# Patient Record
Sex: FEMALE | Race: WHITE | NOT HISPANIC OR LATINO | Employment: OTHER | URBAN - METROPOLITAN AREA
[De-identification: names, ages, dates, MRNs, and addresses within clinical notes are randomized per-mention and may not be internally consistent; named-entity substitution may affect disease eponyms.]

---

## 2017-02-19 ENCOUNTER — PATIENT OUTREACH (OUTPATIENT)
Dept: HEALTH INFORMATION MANAGEMENT | Facility: OTHER | Age: 67
End: 2017-02-19

## 2018-06-21 ENCOUNTER — OFFICE VISIT (OUTPATIENT)
Dept: URGENT CARE | Facility: CLINIC | Age: 68
End: 2018-06-21
Payer: MEDICARE

## 2018-06-21 VITALS
HEART RATE: 84 BPM | SYSTOLIC BLOOD PRESSURE: 122 MMHG | BODY MASS INDEX: 23.05 KG/M2 | DIASTOLIC BLOOD PRESSURE: 88 MMHG | TEMPERATURE: 99 F | WEIGHT: 135 LBS | HEIGHT: 64 IN | RESPIRATION RATE: 16 BRPM | OXYGEN SATURATION: 100 %

## 2018-06-21 DIAGNOSIS — K12.2 ORAL INFECTION: ICD-10-CM

## 2018-06-21 PROCEDURE — 99214 OFFICE O/P EST MOD 30 MIN: CPT | Performed by: NURSE PRACTITIONER

## 2018-06-21 RX ORDER — AMOXICILLIN 875 MG/1
875 TABLET, COATED ORAL 2 TIMES DAILY
Qty: 14 TAB | Refills: 0 | Status: SHIPPED | OUTPATIENT
Start: 2018-06-21 | End: 2018-10-29

## 2018-06-21 ASSESSMENT — ENCOUNTER SYMPTOMS
NAUSEA: 0
SHORTNESS OF BREATH: 0
EYE DISCHARGE: 0
COUGH: 0
HEADACHES: 0
DIARRHEA: 0
WHEEZING: 0
SORE THROAT: 0
FEVER: 0
MYALGIAS: 0
ORTHOPNEA: 0
CHILLS: 0

## 2018-06-21 ASSESSMENT — PATIENT HEALTH QUESTIONNAIRE - PHQ9: CLINICAL INTERPRETATION OF PHQ2 SCORE: 0

## 2018-06-21 NOTE — PROGRESS NOTES
Subjective:      Aliza Jacobson is a 68 y.o. female who presents with Otalgia (left ear x2days, hx of ear infection)            HPI This is a new problem. 68 year old female presenting with initial complaint of left ear pain, requesting amoxicillin. States she knows this is infected. Denies fever, chills, myalgia, sore throat or congestion. She has taken ibuprofen with no improvement in symptoms.   Patient has no known allergies.  Current Outpatient Prescriptions on File Prior to Visit   Medication Sig Dispense Refill   • zolpidem (AMBIEN) 10 MG Tab Take 1 Tab by mouth at bedtime as needed for Sleep. 10 Tab 0   • sumatriptan (IMITREX) 100 MG tablet Take 1 Tab by mouth Once PRN for Migraine for up to 1 dose. 9 Tab 0     No current facility-administered medications on file prior to visit.      Social History     Social History   • Marital status: Single     Spouse name: N/A   • Number of children: N/A   • Years of education: N/A     Occupational History   • Not on file.     Social History Main Topics   • Smoking status: Former Smoker     Quit date: 7/13/1974   • Smokeless tobacco: Never Used   • Alcohol use Yes      Comment: rarely   • Drug use: No   • Sexual activity: Not on file     Other Topics Concern   • Not on file     Social History Narrative   • No narrative on file     family history includes Cancer in her father and mother; Heart Disease in her father.      Review of Systems   Constitutional: Positive for malaise/fatigue. Negative for chills and fever.   HENT: Positive for ear pain. Negative for congestion and sore throat.    Eyes: Negative for discharge.   Respiratory: Negative for cough, shortness of breath and wheezing.    Cardiovascular: Negative for chest pain and orthopnea.   Gastrointestinal: Negative for diarrhea and nausea.   Musculoskeletal: Negative for myalgias.   Neurological: Negative for headaches.   Endo/Heme/Allergies: Negative for environmental allergies.          Objective:     /88   " Pulse 84   Temp 37.2 °C (99 °F)   Resp 16   Ht 1.626 m (5' 4\")   Wt 61.2 kg (135 lb)   SpO2 100%   BMI 23.17 kg/m²      Physical Exam   Constitutional: She is oriented to person, place, and time. She appears well-developed and well-nourished. No distress.   HENT:   Head: Normocephalic and atraumatic.   Right Ear: External ear and ear canal normal. Tympanic membrane is not injected and not perforated. No middle ear effusion.   Left Ear: External ear and ear canal normal. Tympanic membrane is not injected and not perforated.  No middle ear effusion.   Nose: No mucosal edema.   Mouth/Throat: No oropharyngeal exudate or posterior oropharyngeal erythema.   Eyes: Conjunctivae are normal. Right eye exhibits no discharge. Left eye exhibits no discharge.   Neck: Normal range of motion. Neck supple.   Cardiovascular: Normal rate, regular rhythm and normal heart sounds.    No murmur heard.  Pulmonary/Chest: Effort normal and breath sounds normal. No respiratory distress.   Musculoskeletal: Normal range of motion.   Normal movement of all 4 extremities.   Lymphadenopathy:     She has no cervical adenopathy.        Right: No supraclavicular adenopathy present.        Left: No supraclavicular adenopathy present.   Neurological: She is alert and oriented to person, place, and time. Gait normal.   Skin: Skin is warm and dry.   Psychiatric: She has a normal mood and affect. Her behavior is normal. Thought content normal.   Nursing note and vitals reviewed.              Assessment/Plan:     1. Oral infection  amoxicillin (AMOXIL) 875 MG tablet     After I informed her of her healthy ears, then her complaint changed to a tooth issue. She does have some mild erythema to her area of concern in her gum. Amoxicillin x 7 days. No pain medication given. She may take ibuprofen.      "

## 2018-10-29 ENCOUNTER — HOSPITAL ENCOUNTER (OUTPATIENT)
Facility: MEDICAL CENTER | Age: 68
End: 2018-10-29
Attending: FAMILY MEDICINE
Payer: MEDICARE

## 2018-10-29 ENCOUNTER — OFFICE VISIT (OUTPATIENT)
Dept: URGENT CARE | Facility: CLINIC | Age: 68
End: 2018-10-29
Payer: MEDICARE

## 2018-10-29 VITALS
WEIGHT: 132.2 LBS | HEART RATE: 72 BPM | OXYGEN SATURATION: 98 % | RESPIRATION RATE: 18 BRPM | DIASTOLIC BLOOD PRESSURE: 90 MMHG | TEMPERATURE: 99.1 F | BODY MASS INDEX: 22.69 KG/M2 | SYSTOLIC BLOOD PRESSURE: 130 MMHG

## 2018-10-29 DIAGNOSIS — R30.0 DYSURIA: ICD-10-CM

## 2018-10-29 DIAGNOSIS — R21 RASH: ICD-10-CM

## 2018-10-29 LAB
APPEARANCE UR: CLEAR
BILIRUB UR STRIP-MCNC: NORMAL MG/DL
COLOR UR AUTO: YELLOW
GLUCOSE UR STRIP.AUTO-MCNC: NORMAL MG/DL
KETONES UR STRIP.AUTO-MCNC: NORMAL MG/DL
LEUKOCYTE ESTERASE UR QL STRIP.AUTO: NORMAL
NITRITE UR QL STRIP.AUTO: NORMAL
PH UR STRIP.AUTO: 7 [PH] (ref 5–8)
PROT UR QL STRIP: NORMAL MG/DL
RBC UR QL AUTO: NORMAL
SP GR UR STRIP.AUTO: 1.02
UROBILINOGEN UR STRIP-MCNC: 0.2 MG/DL

## 2018-10-29 PROCEDURE — 99000 SPECIMEN HANDLING OFFICE-LAB: CPT | Performed by: FAMILY MEDICINE

## 2018-10-29 PROCEDURE — 81002 URINALYSIS NONAUTO W/O SCOPE: CPT | Performed by: FAMILY MEDICINE

## 2018-10-29 PROCEDURE — 99214 OFFICE O/P EST MOD 30 MIN: CPT | Performed by: FAMILY MEDICINE

## 2018-10-29 PROCEDURE — 87086 URINE CULTURE/COLONY COUNT: CPT

## 2018-10-29 RX ORDER — NITROFURANTOIN 25; 75 MG/1; MG/1
100 CAPSULE ORAL EVERY 12 HOURS
Qty: 10 CAP | Refills: 0 | Status: SHIPPED | OUTPATIENT
Start: 2018-10-29 | End: 2018-11-03

## 2018-10-29 RX ORDER — PREDNISONE 10 MG/1
30 TABLET ORAL EVERY MORNING
Qty: 21 TAB | Refills: 0 | Status: SHIPPED | OUTPATIENT
Start: 2018-10-29 | End: 2018-11-05

## 2018-10-29 RX ORDER — TRIAMCINOLONE ACETONIDE 1 MG/G
OINTMENT TOPICAL
Qty: 45 G | Refills: 0 | Status: SHIPPED | OUTPATIENT
Start: 2018-10-29 | End: 2018-11-15

## 2018-10-29 RX ORDER — ESTRADIOL 0.05 MG/D
1 PATCH TRANSDERMAL
Refills: 0 | COMMUNITY
Start: 2018-08-15

## 2018-10-29 NOTE — PROGRESS NOTES
Subjective:      Aliza Jacobson is a 68 y.o. female who presents with Dysuria (burning sensation and just finised a coarse of antibiotics for UTI x 1 month ago ) and Rash (just moved from [place], red chest and hands )      - This is a very pleasant, well and non-toxic appearing 68 y.o. female with complaints of urinary freq/burn x 2 days. No NVFC    - itchy rash on upper chest x 2 days. Nothing new she can think of. otc benadryl helps           ALLERGIES:  Patient has no known allergies.     PMH:  Past Medical History:   Diagnosis Date   • Backpain    • Breast CA (HCC)    • Bronchitis    • Cancer (HCC)     breast ca   • Migraine    • Other specified symptom associated with female genital organs     yeast infections   • Pneumonia    • Psychiatric problem     anxiety   • Renal disorder     acute renal failure        MEDS:    Current Outpatient Prescriptions:   •  triamcinolone acetonide (KENALOG) 0.1 % Ointment, BID x 1 wk, Disp: 45 g, Rfl: 0  •  predniSONE (DELTASONE) 10 MG Tab, Take 3 Tabs by mouth every morning for 7 days., Disp: 21 Tab, Rfl: 0  •  nitrofurantoin monohydr macro (MACROBID) 100 MG Cap, Take 1 Cap by mouth every 12 hours for 5 days., Disp: 10 Cap, Rfl: 0  •  zolpidem (AMBIEN) 10 MG Tab, Take 1 Tab by mouth at bedtime as needed for Sleep., Disp: 10 Tab, Rfl: 0  •  estradiol (CLIMARA) 0.05 MG/24HR PATCH WEEKLY, 1 Patch. AS DIRECTED, Disp: , Rfl: 0  •  sumatriptan (IMITREX) 100 MG tablet, Take 1 Tab by mouth Once PRN for Migraine for up to 1 dose., Disp: 9 Tab, Rfl: 0    ** I have documented what I find to be significant in regards to past medical, social, family and surgical history  in my HPI or under PMH/PSH/FH review section, otherwise it is contributory **           HPI    Review of Systems   Genitourinary: Positive for dysuria and frequency.   Skin: Positive for itching and rash.   All other systems reviewed and are negative.         Objective:     /90   Pulse 72   Temp 37.3 °C (99.1 °F)    Resp 18   Wt 60 kg (132 lb 3.2 oz)   SpO2 98%   BMI 22.69 kg/m²      Physical Exam   Constitutional: She appears well-developed. No distress.   HENT:   Head: Normocephalic and atraumatic.   Cardiovascular: Regular rhythm.    No murmur heard.  Pulmonary/Chest: Effort normal and breath sounds normal. No respiratory distress.   Abdominal: Soft. There is no tenderness.   Neurological: She is alert. She exhibits normal muscle tone.   Skin: Skin is warm and dry.   Psychiatric: She has a normal mood and affect. Judgment normal.   Nursing note and vitals reviewed.  upper chest w/ dry erythematous patch             Assessment/Plan:         1. Rash  triamcinolone acetonide (KENALOG) 0.1 % Ointment    predniSONE (DELTASONE) 10 MG Tab    REFERRAL TO FAMILY PRACTICE    REFERRAL TO DERMATOLOGY   2. Dysuria  REFERRAL TO FAMILY PRACTICE    REFERRAL TO DERMATOLOGY    POCT Urinalysis    Urine Culture    nitrofurantoin monohydr macro (MACROBID) 100 MG Cap             Dx & d/c instructions discussed w/ patient and/or family members.     ER precautions (worsening signs symptoms and when to go to ER) discussed.    Follow up w/ PCP in 2-3 days to make sure symptoms improving and no further intervention/treatment and/or work-up needed was advised, ER if feeling worse or not improving in 2 days.    Possible side effects (i.e. Rash, GI upset/constipation, sedation, elevation of BP or sugars) of any medications given discussed.     Patient left in stable condition

## 2018-11-01 LAB
BACTERIA UR CULT: NORMAL
SIGNIFICANT IND 70042: NORMAL
SITE SITE: NORMAL
SOURCE SOURCE: NORMAL

## 2018-11-15 ENCOUNTER — OFFICE VISIT (OUTPATIENT)
Dept: URGENT CARE | Facility: CLINIC | Age: 68
End: 2018-11-15
Payer: MEDICARE

## 2018-11-15 VITALS
HEART RATE: 74 BPM | TEMPERATURE: 97.4 F | WEIGHT: 132 LBS | SYSTOLIC BLOOD PRESSURE: 116 MMHG | OXYGEN SATURATION: 100 % | HEIGHT: 64 IN | DIASTOLIC BLOOD PRESSURE: 74 MMHG | BODY MASS INDEX: 22.53 KG/M2

## 2018-11-15 DIAGNOSIS — R42 DIZZINESS: ICD-10-CM

## 2018-11-15 DIAGNOSIS — R35.0 URINARY FREQUENCY: ICD-10-CM

## 2018-11-15 LAB
APPEARANCE UR: CLEAR
BILIRUB UR STRIP-MCNC: NORMAL MG/DL
COLOR UR AUTO: YELLOW
GLUCOSE UR STRIP.AUTO-MCNC: NORMAL MG/DL
KETONES UR STRIP.AUTO-MCNC: NORMAL MG/DL
LEUKOCYTE ESTERASE UR QL STRIP.AUTO: NORMAL
NITRITE UR QL STRIP.AUTO: NORMAL
PH UR STRIP.AUTO: 6.5 [PH] (ref 5–8)
PROT UR QL STRIP: NORMAL MG/DL
RBC UR QL AUTO: NORMAL
SP GR UR STRIP.AUTO: 1.02
UROBILINOGEN UR STRIP-MCNC: 0.2 MG/DL

## 2018-11-15 PROCEDURE — 81002 URINALYSIS NONAUTO W/O SCOPE: CPT | Performed by: NURSE PRACTITIONER

## 2018-11-15 PROCEDURE — 99213 OFFICE O/P EST LOW 20 MIN: CPT | Performed by: NURSE PRACTITIONER

## 2018-11-15 ASSESSMENT — ENCOUNTER SYMPTOMS
FATIGUE: 1
DIZZINESS: 1
SENSORY CHANGE: 0
FOCAL WEAKNESS: 0
MYALGIAS: 0
CHILLS: 0
ABDOMINAL PAIN: 0
SPEECH CHANGE: 0
EYE PAIN: 0
SEIZURES: 0
NERVOUS/ANXIOUS: 1
FEVER: 0
HEADACHES: 0
CHANGE IN BOWEL HABIT: 0
VOMITING: 0
LOSS OF CONSCIOUSNESS: 0
SORE THROAT: 0
SHORTNESS OF BREATH: 0
TREMORS: 0
FLANK PAIN: 0
NAUSEA: 0
WEAKNESS: 0
TINGLING: 0

## 2018-11-15 NOTE — PROGRESS NOTES
Subjective:   Aliza Jacobson is a 68 y.o. female who presents for Urinary Frequency (Burning/Dizzy X2 weeks Took nitrofurnatol perscribed felt better for one day then came back )  Patient presents to clinic today for reevaluation of urinary frequency, burning, dizziness.  Patient was seen and evaluated 10/29 which she was placed on Macrobid for suspected UTI.  Urine culture was negative for bacterial etiology.  Patient states symptoms improved times 1 day however came back.  Patient verbalizing she feels dizzy, feels as if she is hung over.  She denies any substance, illicit drug use, alcohol use.  She has taken hydrocodone in the past for dental work in the last month.  She denies taking hydrocodone last evening.  She is eating and drinking.  She does feel increased thirstiness as she is using the restroom more frequently.  Patient unable to sleep last evening as she was up constantly going to the bathroom during the nighttime.      Urinary Frequency   This is a new problem. Episode onset: 4 days. The problem occurs constantly (night time). The problem has been unchanged. Associated symptoms include fatigue. Pertinent negatives include no abdominal pain, change in bowel habit, chest pain, chills, congestion, fever, headaches, myalgias, nausea, rash, sore throat, urinary symptoms, vomiting or weakness. Nothing aggravates the symptoms. She has tried nothing for the symptoms. The treatment provided no relief.     Review of Systems   Constitutional: Positive for fatigue. Negative for chills and fever.   HENT: Negative for congestion and sore throat.    Eyes: Negative for pain.   Respiratory: Negative for shortness of breath.    Cardiovascular: Negative for chest pain.   Gastrointestinal: Negative for abdominal pain, change in bowel habit, nausea and vomiting.   Genitourinary: Positive for dysuria and frequency. Negative for flank pain, hematuria and urgency.   Musculoskeletal: Negative for myalgias.   Skin: Negative  "for rash.   Neurological: Positive for dizziness. Negative for tingling, tremors, sensory change, speech change, focal weakness, seizures, loss of consciousness, weakness and headaches.   Psychiatric/Behavioral: The patient is nervous/anxious.      No Known Allergies   Objective:   /74   Pulse 74   Temp 36.3 °C (97.4 °F)   Ht 1.626 m (5' 4\")   Wt 59.9 kg (132 lb)   SpO2 100%   BMI 22.66 kg/m²   Physical Exam   Constitutional: She is oriented to person, place, and time. She appears well-developed and well-nourished. No distress.   HENT:   Head: Normocephalic and atraumatic.   Right Ear: Tympanic membrane normal.   Left Ear: Tympanic membrane normal.   Nose: Nose normal. Right sinus exhibits no maxillary sinus tenderness and no frontal sinus tenderness. Left sinus exhibits no maxillary sinus tenderness and no frontal sinus tenderness.   Mouth/Throat: Uvula is midline, oropharynx is clear and moist and mucous membranes are normal. No posterior oropharyngeal edema, posterior oropharyngeal erythema or tonsillar abscesses. No tonsillar exudate.   Eyes: Pupils are equal, round, and reactive to light. Conjunctivae and EOM are normal. Right eye exhibits no discharge. Left eye exhibits no discharge.   Cardiovascular: Normal rate and regular rhythm.    No murmur heard.  Pulmonary/Chest: Effort normal and breath sounds normal. No respiratory distress.   Abdominal: Soft. She exhibits no distension. There is tenderness in the suprapubic area. There is no CVA tenderness.   Musculoskeletal:        Left hip: She exhibits tenderness. She exhibits normal range of motion.        Legs:  Neurological: She is alert and oriented to person, place, and time. She has normal reflexes. She is not disoriented. No cranial nerve deficit or sensory deficit. GCS eye subscore is 4. GCS verbal subscore is 5. GCS motor subscore is 6.   Skin: Skin is warm, dry and intact.   Psychiatric: Thought content normal. Her mood appears anxious. She " is slowed.   Speech slowed         Assessment/Plan:     1. Urinary frequency     2. Dizziness       Urinalysis; negative    Previous urine culture negative for bacterial etiology do not suspect infectious etiology at this time.  We will not start patient on treatment.  Patient declining stat labs to be sent as she would like to follow-up tomorrow if symptoms not improved for lab work to be completed. Some concern if patient has taken any oral narcotics as she did stat she has been taking them for dental pain and she did appear to be slowed in her speech. Neuro exam unremarkable.     Patient given precautionary s/sx that mandate immediate follow up and evaluation in the ED. Advised of risks of not doing so.    DDX, Supportive care, and indications for immediate follow-up discussed with patient.    Instructed to return to clinic or nearest emergency department if we are not available for any change in condition, further concerns, or worsening of symptoms.    The patient demonstrated a good understanding and agreed with the treatment plan.

## 2019-02-12 ENCOUNTER — OFFICE VISIT (OUTPATIENT)
Dept: URGENT CARE | Facility: CLINIC | Age: 69
End: 2019-02-12
Payer: MEDICARE

## 2019-02-12 VITALS
RESPIRATION RATE: 16 BRPM | DIASTOLIC BLOOD PRESSURE: 82 MMHG | HEART RATE: 88 BPM | SYSTOLIC BLOOD PRESSURE: 128 MMHG | TEMPERATURE: 98.3 F | WEIGHT: 132 LBS | OXYGEN SATURATION: 98 % | HEIGHT: 64 IN | BODY MASS INDEX: 22.53 KG/M2

## 2019-02-12 DIAGNOSIS — R42 DIZZY: ICD-10-CM

## 2019-02-12 DIAGNOSIS — H93.8X2 CLOGGED EAR, LEFT: ICD-10-CM

## 2019-02-12 PROCEDURE — 99214 OFFICE O/P EST MOD 30 MIN: CPT | Performed by: FAMILY MEDICINE

## 2019-02-12 RX ORDER — FLUTICASONE PROPIONATE 50 MCG
2 SPRAY, SUSPENSION (ML) NASAL
Qty: 1 BOTTLE | Refills: 1 | Status: SHIPPED | OUTPATIENT
Start: 2019-02-12

## 2019-02-12 RX ORDER — PREDNISONE 10 MG/1
30 TABLET ORAL EVERY MORNING
Qty: 21 TAB | Refills: 0 | Status: SHIPPED | OUTPATIENT
Start: 2019-02-12 | End: 2019-02-19

## 2019-02-12 ASSESSMENT — ENCOUNTER SYMPTOMS
HEMOPTYSIS: 0
FEVER: 0
ORTHOPNEA: 0
CHILLS: 0
SHORTNESS OF BREATH: 0
DIZZINESS: 1
FOCAL WEAKNESS: 0

## 2019-02-12 NOTE — PROGRESS NOTES
Subjective:      Aliza Jacobson is a 68 y.o. female who presents with Ear Fullness (X1 week (L) ear with dizziness)      - This is a very pleasant, well and non-toxic appearing 68 y.o. female with complaints of feeling a little off balance and a little like room spins at times and foggy headed described as a feeling like she needs to get another cocaine high for past 2 wks. Lt ear has felt full/clogged w/ occasional pain x 2 wks as well. No NVFC, no CP/SOB. No focal limb weakness/numbness/heaviness or speech/vision changes.           ALLERGIES:  Patient has no known allergies.     PMH:  Past Medical History:   Diagnosis Date   • Backpain    • Breast CA (HCC)    • Bronchitis    • Cancer (HCC)     breast ca   • Migraine    • Other specified symptom associated with female genital organs     yeast infections   • Pneumonia    • Psychiatric problem     anxiety   • Renal disorder     acute renal failure        PSH:  Past Surgical History:   Procedure Laterality Date   • LUMPECTOMY  1987   • OTHER      face lift       MEDS:    Current Outpatient Prescriptions:   •  predniSONE (DELTASONE) 10 MG Tab, Take 3 Tabs by mouth every morning for 7 days., Disp: 21 Tab, Rfl: 0  •  fluticasone (FLONASE) 50 MCG/ACT nasal spray, Spray 2 Sprays in nose every bedtime., Disp: 1 Bottle, Rfl: 1  •  estradiol (CLIMARA) 0.05 MG/24HR PATCH WEEKLY, 1 Patch. AS DIRECTED, Disp: , Rfl: 0  •  zolpidem (AMBIEN) 10 MG Tab, Take 1 Tab by mouth at bedtime as needed for Sleep., Disp: 10 Tab, Rfl: 0    ** I have documented what I find to be significant in regards to past medical, social, family and surgical history  in my HPI or under PMH/PSH/FH review section, otherwise it is contributory **           HPI    Review of Systems   Constitutional: Negative for chills and fever.   HENT: Positive for ear pain.    Respiratory: Negative for hemoptysis and shortness of breath.    Cardiovascular: Negative for chest pain and orthopnea.   Neurological: Positive for  "dizziness. Negative for focal weakness.          Objective:     /82   Pulse 88   Temp 36.8 °C (98.3 °F)   Resp 16   Ht 1.626 m (5' 4\")   Wt 59.9 kg (132 lb)   SpO2 98%   BMI 22.66 kg/m²      Physical Exam   Constitutional: She appears well-developed. No distress.   HENT:   Head: Normocephalic and atraumatic.   Mouth/Throat: Oropharynx is clear and moist.   Eyes: Conjunctivae are normal.   Neck: Neck supple.   Cardiovascular: Regular rhythm.    No murmur heard.  Pulmonary/Chest: Effort normal and breath sounds normal. No respiratory distress.   Neurological: She is alert. She exhibits normal muscle tone.   Skin: Skin is warm and dry.   Psychiatric: She has a normal mood and affect. Judgment normal.   Nursing note and vitals reviewed.  Lt ear: TM normal color translucent and bulging w/ serous fluid  Rt ear: a little was build up, TM wnl.             Assessment/Plan:         1. Dizzy  REFERRAL TO FAMILY PRACTICE    REFERRAL TO ENT   2. Clogged ear, left  REFERRAL TO FAMILY PRACTICE    predniSONE (DELTASONE) 10 MG Tab    fluticasone (FLONASE) 50 MCG/ACT nasal spray    REFERRAL TO ENT             Dx & d/c instructions discussed w/ patient and/or family members.     ER precautions (worsening signs symptoms and when to go to ER) discussed.    Follow up w/ PCP in 2-3 days to make sure symptoms improving and no further intervention/treatment and/or work-up needed was advised, ER if feeling worse or not improving in 2 days.    Possible side effects (i.e. Rash, GI upset/constipation, sedation, elevation of BP or sugars) of any medications given discussed.     Patient left in stable condition            "

## 2019-02-19 ENCOUNTER — TELEPHONE (OUTPATIENT)
Dept: SCHEDULING | Facility: IMAGING CENTER | Age: 69
End: 2019-02-19

## 2019-02-20 ENCOUNTER — OFFICE VISIT (OUTPATIENT)
Dept: URGENT CARE | Facility: CLINIC | Age: 69
End: 2019-02-20
Payer: MEDICARE

## 2019-02-20 VITALS
HEIGHT: 64 IN | WEIGHT: 132 LBS | BODY MASS INDEX: 22.53 KG/M2 | RESPIRATION RATE: 16 BRPM | TEMPERATURE: 98.2 F | DIASTOLIC BLOOD PRESSURE: 88 MMHG | SYSTOLIC BLOOD PRESSURE: 134 MMHG | HEART RATE: 102 BPM | OXYGEN SATURATION: 99 %

## 2019-02-20 DIAGNOSIS — I73.00 RAYNAUD'S DISEASE WITHOUT GANGRENE: ICD-10-CM

## 2019-02-20 PROCEDURE — 99213 OFFICE O/P EST LOW 20 MIN: CPT | Performed by: FAMILY MEDICINE

## 2019-02-20 RX ORDER — SUMATRIPTAN 100 MG/1
TABLET, FILM COATED ORAL
Refills: 0 | COMMUNITY
Start: 2019-02-14 | End: 2021-05-07

## 2019-02-20 RX ORDER — DEXTROAMPHETAMINE SULFATE 10 MG/1
TABLET ORAL
Refills: 0 | COMMUNITY
Start: 2019-02-06 | End: 2021-05-07

## 2019-02-20 RX ORDER — AMITRIPTYLINE HYDROCHLORIDE 100 MG/1
100 TABLET ORAL
Refills: 0 | COMMUNITY
Start: 2019-01-18 | End: 2019-04-02

## 2019-02-22 ENCOUNTER — HOSPITAL ENCOUNTER (EMERGENCY)
Facility: MEDICAL CENTER | Age: 69
End: 2019-02-23
Attending: EMERGENCY MEDICINE
Payer: MEDICARE

## 2019-02-22 ENCOUNTER — APPOINTMENT (OUTPATIENT)
Dept: RADIOLOGY | Facility: MEDICAL CENTER | Age: 69
End: 2019-02-22
Attending: EMERGENCY MEDICINE
Payer: MEDICARE

## 2019-02-22 DIAGNOSIS — H81.392 PERIPHERAL VERTIGO INVOLVING LEFT EAR: ICD-10-CM

## 2019-02-22 PROCEDURE — 70450 CT HEAD/BRAIN W/O DYE: CPT

## 2019-02-22 PROCEDURE — 700102 HCHG RX REV CODE 250 W/ 637 OVERRIDE(OP): Performed by: EMERGENCY MEDICINE

## 2019-02-22 PROCEDURE — A9270 NON-COVERED ITEM OR SERVICE: HCPCS | Performed by: EMERGENCY MEDICINE

## 2019-02-22 PROCEDURE — 93005 ELECTROCARDIOGRAM TRACING: CPT

## 2019-02-22 PROCEDURE — 99284 EMERGENCY DEPT VISIT MOD MDM: CPT

## 2019-02-22 PROCEDURE — 93005 ELECTROCARDIOGRAM TRACING: CPT | Performed by: EMERGENCY MEDICINE

## 2019-02-22 RX ORDER — MECLIZINE HYDROCHLORIDE 25 MG/1
25 TABLET ORAL ONCE
Status: COMPLETED | OUTPATIENT
Start: 2019-02-22 | End: 2019-02-22

## 2019-02-22 RX ADMIN — MECLIZINE HYDROCHLORIDE 25 MG: 25 TABLET ORAL at 22:16

## 2019-02-23 VITALS
TEMPERATURE: 98.8 F | HEART RATE: 86 BPM | HEIGHT: 64 IN | DIASTOLIC BLOOD PRESSURE: 97 MMHG | OXYGEN SATURATION: 97 % | RESPIRATION RATE: 16 BRPM | BODY MASS INDEX: 23.15 KG/M2 | SYSTOLIC BLOOD PRESSURE: 178 MMHG | WEIGHT: 135.58 LBS

## 2019-02-23 LAB — EKG IMPRESSION: NORMAL

## 2019-02-23 RX ORDER — MECLIZINE HYDROCHLORIDE 25 MG/1
25 TABLET ORAL 3 TIMES DAILY PRN
Qty: 9 TAB | Refills: 0 | Status: SHIPPED | OUTPATIENT
Start: 2019-02-23 | End: 2019-02-26

## 2019-02-23 NOTE — ED NOTES
Pt in bed with friend at bedside, c/o increased dizziness and unbalanced feeling since awakening this morning. Pt states she has had this feeling on and of for years.

## 2019-02-23 NOTE — ED TRIAGE NOTES
Pt bib friend with c/o dizziness and lightheadedness that began earlier today. Pt denies SOB, difficulty breathing or pain anywhere. Pt states that she has a hx of dizziness but this is the worst it's ever been

## 2019-02-23 NOTE — ED NOTES
Pt in bed, friend states pt was more confused and forgetfull tonight. Pt medicated per MAR. ERP in to preform epley maneuver. Pt states she feels about the same after maneuver.

## 2019-02-23 NOTE — DISCHARGE INSTRUCTIONS
Return to the emergency department in the next 24-48 hours for any of your symptoms worsen or they are not improving you have changes in your vision speech or difficulty ambulating.

## 2019-02-23 NOTE — ED NOTES
"Pt in bed, states she feels the same, still dizzy. Pt states she does not want to know the results of CT, because she had one done a few months ago in [place], and \"was told bad news by tech, but never followed up to hear results from doctor.\" Pt afraid something will be found that is not treatable. Pt then changed her mind, states she wants the CT.  "

## 2019-02-23 NOTE — ED PROVIDER NOTES
ED Provider Note    CHIEF COMPLAINT  Chief Complaint   Patient presents with   • Near Syncopal   • Dizziness       HPI  Aliza Jacobson is a 68 y.o. female who presents to the emergency room with chief complaint of dizziness.  The patient states she has had a fullness in her left ear for several years and dizziness on and off for the same amount of time.  She states she had a workup in [place] but she has had pretty severe dizziness over the last 48 hours.  Friends states she went to dinner with her night she is just been more forgetful than normal.  The patient feels a little frazzled because she is so dizzy.  She states that the room spinning as well as a balance issue.  She denies any vision changes or speech changes any nausea or vomiting or weakness numbness or tingling in any of her extremities.  She does not endorse any ear pain or ringing at this time but states she usually feels a fullness on that side.  She does have a history of breast cancer and a former smoker but otherwise has no medical problems and takes no medications    REVIEW OF SYSTEMS  Positives as above. Pertinent negatives include weakness numbness tingling vision changes speech difficulty nausea vomiting  All other review of systems are negative    PAST MEDICAL HISTORY   has a past medical history of Backpain; Breast CA (HCC); Bronchitis; Cancer (HCC); Migraine; Other specified symptom associated with female genital organs; Pneumonia; Psychiatric problem; and Renal disorder.    SOCIAL HISTORY  Social History     Social History Main Topics   • Smoking status: Former Smoker     Quit date: 7/13/1974   • Smokeless tobacco: Never Used   • Alcohol use Yes      Comment: rarely   • Drug use: No   • Sexual activity: Not on file       SURGICAL HISTORY   has a past surgical history that includes lumpectomy (1987) and other.    CURRENT MEDICATIONS  Home Medications     Reviewed by Shade Esquivel R.N. (Registered Nurse) on 02/22/19 at 0270  Med List  "Status: Partial   Medication Last Dose Status   amitriptyline (ELAVIL) 100 MG Tab  Active   dextroamphetamine (DEXTROSTAT) 10 MG tablet  Active   estradiol (CLIMARA) 0.05 MG/24HR PATCH WEEKLY  Active   fluticasone (FLONASE) 50 MCG/ACT nasal spray  Active   sumatriptan (IMITREX) 100 MG tablet  Active   zolpidem (AMBIEN) 10 MG Tab  Active                ALLERGIES  No Known Allergies    PHYSICAL EXAM  VITAL SIGNS: /87   Pulse 94   Temp 36.9 °C (98.5 °F) (Temporal)   Resp 18   Ht 1.626 m (5' 4\")   Wt 61.5 kg (135 lb 9.3 oz)   SpO2 96%   BMI 23.27 kg/m²    Pulse ox interpretation: I interpret this pulse ox as normal.  Constitutional: Alert in no apparent distress.  HENT: Normocephalic atraumatic, MMM, bilateral tympanic membranes within normal limits  Eyes: PERRLA, L sided nystagmus, Conjunctiva normal, Non-icteric.   Neck: Normal range of motion, No tenderness, Supple, No stridor.   Cardiovascular: Regular rate and rhythm, no murmurs.   Thorax & Lungs: Normal breath sounds, No respiratory distress, No wheezing, No chest tenderness.   Abdomen: Bowel sounds normal, Soft, No tenderness, No pulsatile masses. No peritoneal signs.  Skin: Warm, Dry, No erythema, No rash.   Back: No bony tenderness, No CVA tenderness.   Extremities: Intact distal pulses, No edema, No tenderness, No cyanosis  Neurologic: Alert and oriented x3,  Symmetric smile, eyes shut tight bilaterally, forehead wrinkles bilaterally, sensation intact to light touch bilateral face, tongue midline, head turn and shoulder shrug with full strength. Hearing intact grossly bilaterally. 5/5  strength bilaterally, 5/5 tricep and bicep strength bilaterally. Sensation intact to light touch r, m, u, axillary nerves bilaterally. 5/5 strength quadricep, plantarflexion/dorsiflexion/extensor hallicus longus bilaterally. Sensation intact to light touch bilateral lower extremities in all nerve distributions. Steady gait  HINTS Exam:  1. Nystagmus: L " beating  2. Test of Skew: negative  3. Head Impulse Test: positive     DIFFERENTIAL DIAGNOSIS AND WORK UP PLAN    This is a 68 y.o. female who presents with what appears to be chronic peripheral vertigo.  She does have a left beating nystagmus with a hence exam that is positive with the head impulse test.  Will perform the Epley maneuver treat patient with some meclizine and reassess    DIAGNOSTIC STUDIES / PROCEDURES    EKG  Results for orders placed or performed during the hospital encounter of 19   EKG   Result Value Ref Range    Report       [facility] Emergency Dept.    Test Date:  2019  Pt Name:    BHAVANI JOHNSON               Department: EDS  MRN:        [id number]                      Room:  Gender:     Female                       Technician:   :        [id number]                   Requested By:ER TRIAGE PROTOCOL  Order #:    [id number]                    Reading MD: Lorena Yousif MD    Measurements  Intervals                                Axis  Rate:       82                           P:          0  TN:         130                          QRS:        61  QRSD:       90                           T:          19  QT:         380  QTc:        444    Interpretive Statements  SINUS RHYTHM no ST elevations no ST depressions no abnormal T wave  inversions, no  pathopneumonic Q waves normal intervals normal axis unchanged from prior  Compared to ECG 2012 09:55:54  No significant changes    Electronically Signed On 2019 3:52:02 PST by Lorena Yousif MD         LABS  Pertinent Lab Findings    Labs Reviewed - No data to display    RADIOLOGY  CT-HEAD W/O   Final Result         1.  No acute intracranial abnormality.   2.  Atherosclerosis.        The radiologist's interpretation of all radiological studies have been reviewed by me.      COURSE & MEDICAL DECISION MAKING  Pertinent Labs & Imaging studies reviewed. (See chart for details)    10:28 PM  Perform the Epley  "maneuver at the bedside she states she does not really feel a change in her discomfort toward the dizziness.  The friend states she is been more forgetful than normal after the last 2 days and we discussed a possible CT scan because if she did have a mild stroke over 48 hours ago potentially would be able to see it on a Noncon although her symptoms and hence exam are more indicative of a peripheral cause this will be the plan at this time    11:03 PM  Patient reassessed at the bedside after epley and antivert she states she is still feeling dizzy, pending CT    12:05 AM  Reassess patient after the CT scan again she is feeling a little bit improved states she still feeling mildly dizzy.  We discussed her normal CT scan results -patient has signs mostly consistent with peripheral vertigo she had symptoms for greater than 48 hours and on and off for the last year or 2.  She wishes to go home which I am okay with at this time we discussed the importance of following up for repeat blood pressure evaluation and return to the emergency department for any new or worsening symptoms such as dizziness associated with vision changes speech difficulty weakness numbness tingling.  She understands feels comfortable going home I do not believe this is hypertensive emergency and her current NIH score is 0    BP (!) 178/97   Pulse 86   Temp 37.1 °C (98.8 °F) (Temporal)   Resp 16   Ht 1.626 m (5' 4\")   Wt 61.5 kg (135 lb 9.3 oz)   SpO2 97%   BMI 23.27 kg/m²       The patient will return for new or worsening symptoms and is stable at the time of discharge.    The patient is referred to a primary physician for blood pressure management, diabetic screening, and for all other preventative health concerns.    DISPOSITION:  Patient will be discharged home in stable condition.    FOLLOW UP:  [facility], Emergency Dept  [address]  [place] [id number] [phone]  In 1 day  If symptoms " [address] [address]  [phone]  Schedule an appointment as soon as possible for a visit   for blood pressure recheck      OUTPATIENT MEDICATIONS:  Discharge Medication List as of 2/23/2019 12:36 AM      START taking these medications    Details   meclizine (ANTIVERT) 25 MG Tab Take 1 Tab by mouth 3 times a day as needed for up to 3 days., Disp-9 Tab, R-0, Normal                 FINAL IMPRESSION  1. Peripheral vertigo involving left ear            Electronically signed by: Lorena Yousif, 2/22/2019 9:52 PM    This dictation has been created using voice recognition software and/or scribes. The accuracy of the dictation is limited by the abilities of the software and the expertise of the scribes. I expect there may be some errors of grammar and possibly content. I made every attempt to manually correct the errors within my dictation. However, errors related to voice recognition software and/or scribes may still exist and should be interpreted within the appropriate context.

## 2019-02-27 ENCOUNTER — OFFICE VISIT (OUTPATIENT)
Dept: URGENT CARE | Facility: CLINIC | Age: 69
End: 2019-02-27
Payer: MEDICARE

## 2019-02-27 VITALS
TEMPERATURE: 98.1 F | WEIGHT: 135 LBS | SYSTOLIC BLOOD PRESSURE: 118 MMHG | HEIGHT: 64 IN | HEART RATE: 80 BPM | DIASTOLIC BLOOD PRESSURE: 80 MMHG | OXYGEN SATURATION: 97 % | BODY MASS INDEX: 23.05 KG/M2 | RESPIRATION RATE: 20 BRPM

## 2019-02-27 DIAGNOSIS — H81.399 PERIPHERAL VERTIGO, UNSPECIFIED LATERALITY: Primary | ICD-10-CM

## 2019-02-27 PROCEDURE — 99214 OFFICE O/P EST MOD 30 MIN: CPT | Performed by: NURSE PRACTITIONER

## 2019-02-27 RX ORDER — PROCHLORPERAZINE MALEATE 10 MG
10 TABLET ORAL EVERY 6 HOURS PRN
Qty: 30 TAB | Refills: 3 | Status: SHIPPED | OUTPATIENT
Start: 2019-02-27 | End: 2019-04-02

## 2019-02-27 ASSESSMENT — ENCOUNTER SYMPTOMS
VERTIGO: 1
VISUAL CHANGE: 0
CONSTITUTIONAL NEGATIVE: 1
DIZZINESS: 1
SHORTNESS OF BREATH: 0
FOCAL WEAKNESS: 0
WEAKNESS: 0
NAUSEA: 0
PSYCHIATRIC NEGATIVE: 1
EYES NEGATIVE: 1
FEVER: 0
CARDIOVASCULAR NEGATIVE: 1
COUGH: 0
FEVER: 0
TINGLING: 0
VOMITING: 0
GASTROINTESTINAL NEGATIVE: 1
SPEECH CHANGE: 0
RESPIRATORY NEGATIVE: 1
SENSORY CHANGE: 0
MUSCULOSKELETAL NEGATIVE: 1
FATIGUE: 0
NUMBNESS: 0
BLURRED VISION: 0

## 2019-02-27 NOTE — PROGRESS NOTES
"Subjective:   Aliza Jacobson is a 68 y.o. female who presents for Rash (X1 week redness on both hands/X3 days dizziness)        Rash   This is a new problem. The current episode started in the past 7 days. The problem has been waxing and waning since onset. The affected locations include the left hand and right hand. Rash characteristics: Red and blue mottling. Pertinent negatives include no congestion, cough, facial edema, fever or shortness of breath.     Review of Systems   Constitutional: Negative for fever.   HENT: Negative for congestion.    Respiratory: Negative for cough and shortness of breath.    Skin: Positive for rash.     No Known Allergies   Objective:   /88 (BP Location: Right arm, Patient Position: Sitting, BP Cuff Size: Adult)   Pulse (!) 102   Temp 36.8 °C (98.2 °F) (Temporal)   Resp 16   Ht 1.626 m (5' 4\")   Wt 59.9 kg (132 lb)   SpO2 99%   BMI 22.66 kg/m²   Physical Exam   Constitutional: She is oriented to person, place, and time. She appears well-developed and well-nourished. No distress.   Eyes: Pupils are equal, round, and reactive to light. EOM are normal.   Cardiovascular: Normal rate, regular rhythm, normal heart sounds and intact distal pulses.    Pulmonary/Chest: Effort normal and breath sounds normal. No respiratory distress.   Abdominal: Soft. Bowel sounds are normal. She exhibits no distension.   Musculoskeletal: Normal range of motion.   Neurological: She is alert and oriented to person, place, and time. She has normal reflexes.   Skin: Skin is warm and dry.   Bluish discoloration of bilateral finger and hands   Psychiatric: She has a normal mood and affect. Her behavior is normal.         Assessment/Plan:   1. Raynaud's disease without gangrene  - REFERRAL TO IMMUNOLOGY    Other orders  - amitriptyline (ELAVIL) 100 MG Tab; Take 100 mg by mouth.; Refill: 0  - dextroamphetamine (DEXTROSTAT) 10 MG tablet; TK 2 TS PO BID FOR 28 DAYS; Refill: 0  - sumatriptan (IMITREX) 100 " MG tablet; TAKE 1 TABLET BY MOUTH AT ONSET OF MIGRAINE HEADACHE MAY REPEAT IN 2 HRS IF NEEDED; Refill: 0    Differential diagnosis, natural history, supportive care, and indications for immediate follow-up discussed.

## 2019-02-27 NOTE — PATIENT INSTRUCTIONS
Vertigo  Vertigo is the feeling that you or your surroundings are moving when they are not. Vertigo can be dangerous if it occurs while you are doing something that could endanger you or others, such as driving.  What are the causes?  This condition is caused by a disturbance in the signals that are sent by your body’s sensory systems to your brain. Different causes of a disturbance can lead to vertigo, including:  · Infections, especially in the inner ear.  · A bad reaction to a drug, or misuse of alcohol and medicines.  · Withdrawal from drugs or alcohol.  · Quickly changing positions, as when lying down or rolling over in bed.  · Migraine headaches.  · Decreased blood flow to the brain.  · Decreased blood pressure.  · Increased pressure in the brain from a head or neck injury, stroke, infection, tumor, or bleeding.  · Central nervous system disorders.  What are the signs or symptoms?  Symptoms of this condition usually occur when you move your head or your eyes in different directions. Symptoms may start suddenly, and they usually last for less than a minute. Symptoms may include:  · Loss of balance and falling.  · Feeling like you are spinning or moving.  · Feeling like your surroundings are spinning or moving.  · Nausea and vomiting.  · Blurred vision or double vision.  · Difficulty hearing.  · Slurred speech.  · Dizziness.  · Involuntary eye movement (nystagmus).  Symptoms can be mild and cause only slight annoyance, or they can be severe and interfere with daily life. Episodes of vertigo may return (recur) over time, and they are often triggered by certain movements. Symptoms may improve over time.  How is this diagnosed?  This condition may be diagnosed based on medical history and the quality of your nystagmus. Your health care provider may test your eye movements by asking you to quickly change positions to trigger the nystagmus. This may be called the Nestor-Hallpike test, head thrust test, or roll test. You  may be referred to a health care provider who specializes in ear, nose, and throat (ENT) problems (otolaryngologist) or a provider who specializes in disorders of the central nervous system (neurologist).  You may have additional testing, including:  · A physical exam.  · Blood tests.  · MRI.  · A CT scan.  · An electrocardiogram (ECG). This records electrical activity in your heart.  · An electroencephalogram (EEG). This records electrical activity in your brain.  · Hearing tests.  How is this treated?  Treatment for this condition depends on the cause and the severity of the symptoms. Treatment options include:  · Medicines to treat nausea or vertigo. These are usually used for severe cases. Some medicines that are used to treat other conditions may also reduce or eliminate vertigo symptoms. These include:  ¨ Medicines that control allergies (antihistamines).  ¨ Medicines that control seizures (anticonvulsants).  ¨ Medicines that relieve depression (antidepressants).  ¨ Medicines that relieve anxiety (sedatives).  · Head movements to adjust your inner ear back to normal. If your vertigo is caused by an ear problem, your health care provider may recommend certain movements to correct the problem.  · Surgery. This is rare.  Follow these instructions at home:  Safety  · Move slowly.Avoid sudden body or head movements.  · Avoid driving.  · Avoid operating heavy machinery.  · Avoid doing any tasks that would cause danger to you or others if you would have a vertigo episode during the task.  · If you have trouble walking or keeping your balance, try using a cane for stability. If you feel dizzy or unstable, sit down right away.  · Return to your normal activities as told by your health care provider. Ask your health care provider what activities are safe for you.  General instructions  · Take over-the-counter and prescription medicines only as told by your health care provider.  · Avoid certain positions or movements as  told by your health care provider.  · Drink enough fluid to keep your urine clear or pale yellow.  · Keep all follow-up visits as told by your health care provider. This is important.  Contact a health care provider if:  · Your medicines do not relieve your vertigo or they make it worse.  · You have a fever.  · Your condition gets worse or you develop new symptoms.  · Your family or friends notice any behavioral changes.  · Your nausea or vomiting gets worse.  · You have numbness or a “pins and needles” sensation in part of your body.  Get help right away if:  · You have difficulty moving or speaking.  · You are always dizzy.  · You faint.  · You develop severe headaches.  · You have weakness in your hands, arms, or legs.  · You have changes in your hearing or vision.  · You develop a stiff neck.  · You develop sensitivity to light.  This information is not intended to replace advice given to you by your health care provider. Make sure you discuss any questions you have with your health care provider.  Document Released: 09/27/2006 Document Revised: 05/31/2017 Document Reviewed: 04/11/2016  [employer] Interactive Patient Education © 2017 [employer]

## 2019-02-27 NOTE — PROGRESS NOTES
Subjective:     Aliza Jacobson is a 68 y.o. female who presents for Vertigo (X 6 days loss of balance)       Dizziness   Associated symptoms include vertigo. Pertinent negatives include no fatigue, fever, nausea, numbness, visual change, vomiting or weakness.   Patient was seen on 2/2/2019 for further ago.  She had a full workup done including a CT of the head which was negative.  She was diagnosed with peripheral vertigo.  She was started on meclizine which the patient states she has been taking.  However, it has not been helping.  Patient states she has been dizzy for the past several years and is frustrated that she has not been able to get any relief after being worked up multiple times.  She has a primary care appointment but not until 2 more months.  She states she was referred to ENT previously but has not been contacted yet.    PMH:  has a past medical history of Backpain; Breast CA (HCC); Bronchitis; Cancer (HCC); Migraine; Other specified symptom associated with female genital organs; Pneumonia; Psychiatric problem; and Renal disorder. She also has no past medical history of Arrhythmia; Arthritis; ASTHMA; CATARACT; Congestive heart failure (HCC); Diabetes; Glaucoma; Heart murmur; Heart valve disease; Hypertension; Jaundice; Myocardial infarct (HCC); Pacemaker; Personal history of venous thrombosis and embolism; Stroke (HCC); Unspecified disorder of thyroid; or Unspecified hemorrhagic conditions.    MEDS:   Current Outpatient Prescriptions:   •  prochlorperazine (COMPAZINE) 10 MG Tab, Take 1 Tab by mouth every 6 hours as needed., Disp: 30 Tab, Rfl: 3  •  amitriptyline (ELAVIL) 100 MG Tab, Take 100 mg by mouth., Disp: , Rfl: 0  •  dextroamphetamine (DEXTROSTAT) 10 MG tablet, TK 2 TS PO BID FOR 28 DAYS, Disp: , Rfl: 0  •  sumatriptan (IMITREX) 100 MG tablet, TAKE 1 TABLET BY MOUTH AT ONSET OF MIGRAINE HEADACHE MAY REPEAT IN 2 HRS IF NEEDED, Disp: , Rfl: 0  •  fluticasone (FLONASE) 50 MCG/ACT nasal spray,  "Spray 2 Sprays in nose every bedtime., Disp: 1 Bottle, Rfl: 1  •  estradiol (CLIMARA) 0.05 MG/24HR PATCH WEEKLY, 1 Patch. AS DIRECTED, Disp: , Rfl: 0  •  zolpidem (AMBIEN) 10 MG Tab, Take 1 Tab by mouth at bedtime as needed for Sleep., Disp: 10 Tab, Rfl: 0    ALLERGIES: No Known Allergies    SURGHX:   Past Surgical History:   Procedure Laterality Date   • LUMPECTOMY  1987   • OTHER      face lift     SOCHX:  reports that she quit smoking about 44 years ago. She has never used smokeless tobacco. She reports that she drinks alcohol. She reports that she does not use drugs.     FH: Reviewed with patient, not pertinent to this visit.     Review of Systems   Constitutional: Negative.  Negative for fatigue and fever.   HENT: Negative.    Eyes: Negative.  Negative for blurred vision.   Respiratory: Negative.    Cardiovascular: Negative.    Gastrointestinal: Negative.  Negative for nausea and vomiting.   Genitourinary: Negative.    Musculoskeletal: Negative.    Skin: Negative.    Neurological: Positive for dizziness and vertigo. Negative for tingling, sensory change, speech change, focal weakness, weakness and numbness.   Psychiatric/Behavioral: Negative.    All other systems reviewed and are negative.    Objective:     /80 (BP Location: Left arm, Patient Position: Sitting, BP Cuff Size: Adult)   Pulse 80   Temp 36.7 °C (98.1 °F) (Temporal)   Resp 20   Ht 1.626 m (5' 4\")   Wt 61.2 kg (135 lb)   SpO2 97%   BMI 23.17 kg/m²     Physical Exam   Constitutional: She is oriented to person, place, and time. She appears well-developed and well-nourished. She is cooperative. No distress.   HENT:   Head: Normocephalic.   Right Ear: Tympanic membrane and external ear normal.   Left Ear: Tympanic membrane and external ear normal.   Nose: Nose normal.   Mouth/Throat: Uvula is midline, oropharynx is clear and moist and mucous membranes are normal.   Eyes: Pupils are equal, round, and reactive to light. Conjunctivae and lids " are normal. Right eye exhibits nystagmus. Left eye exhibits nystagmus.   Nestor-Hallpike test positive   Neck: Normal range of motion.   Cardiovascular: Normal rate, regular rhythm, normal heart sounds and normal pulses.    Pulmonary/Chest: Effort normal and breath sounds normal. No respiratory distress.   Abdominal: Soft. Bowel sounds are normal.   Musculoskeletal: Normal range of motion. She exhibits no deformity.   Neurological: She is alert and oriented to person, place, and time. She has normal strength. No sensory deficit. Coordination and gait normal.   Hand  strong and equal   Skin: Skin is warm, dry and intact. Capillary refill takes less than 2 seconds.   Psychiatric: She has a normal mood and affect.   Vitals reviewed.       Assessment/Plan:     1. Peripheral vertigo, unspecified laterality  - REFERRAL TO ENT  - prochlorperazine (COMPAZINE) 10 MG Tab; Take 1 Tab by mouth every 6 hours as needed.  Dispense: 30 Tab; Refill: 3    Rx sent electronically for Compazine. New referral given for ENT. Patient has primary care appointment in 2 months.    Patient advised to: Return for 1) Symptoms don't improve or worsen, or go to ER, 2) Follow up with primary care in 7-10 days.    Differential diagnosis, natural history, supportive care, and indications for immediate follow-up discussed. All questions answered. Patient agrees with the plan of care.

## 2019-04-01 ENCOUNTER — TELEPHONE (OUTPATIENT)
Dept: MEDICAL GROUP | Facility: LAB | Age: 69
End: 2019-04-01

## 2019-04-01 NOTE — TELEPHONE ENCOUNTER
NEW PATIENT VISIT PRE-VISIT PLANNING    1.  [employer] Patient is checked in Patient Demographics? YES    2.  Immunizations were updated in Epic using WebIZ?: Epic matches WebIZ       •  Web Iz Recommendations: SHINGRIX (Shingles)    3.  Is this appointment scheduled as a Hospital Follow-Up? No    4.  Patient is due for the following Health Maintenance Topics:   Health Maintenance Due   Topic Date Due   • Annual Wellness Visit  1950   • PAP SMEAR  03/31/1971   • MAMMOGRAM  03/31/1990   • COLONOSCOPY  03/31/2000   • IMM ZOSTER VACCINES (1 of 2) 03/31/2000   • BONE DENSITY  03/31/2015   • IMM PNEUMOCOCCAL 65+ (ADULT) LOW/MEDIUM RISK SERIES (1 of 2 - PCV13) 03/31/2015           5. Orders for overdue Health Maintenance topics pended in Pre-Charting? NO    6.  Reviewed/Updated the following with patient:   •   Preferred Pharmacy? NO       •   Preferred Lab? NO       •   Preferred Communication? NO       •   Allergies? NO       •   Medications? NO       •   Social History? NO       •   Family History (document living status of immediate family members and if + hx of cancer, diabetes, hypertension, hyperlipidemia, heart attack, stroke) NO    7.  Updated Care Team?       •   DME Company (gait device, O2, CPAP, etc.) NO       •   Other Specialists (eye doctor, derm, GYN, cardiology, endo, etc): NO    8.  Patient was NOT informed to arrive 15 min prior to their   scheduled appointment and bring in their medication bottles.

## 2019-04-02 ENCOUNTER — OFFICE VISIT (OUTPATIENT)
Dept: MEDICAL GROUP | Facility: LAB | Age: 69
End: 2019-04-02
Payer: MEDICARE

## 2019-04-02 VITALS
DIASTOLIC BLOOD PRESSURE: 90 MMHG | HEIGHT: 64 IN | TEMPERATURE: 97.3 F | OXYGEN SATURATION: 97 % | HEART RATE: 71 BPM | BODY MASS INDEX: 23.52 KG/M2 | WEIGHT: 137.8 LBS | SYSTOLIC BLOOD PRESSURE: 136 MMHG

## 2019-04-02 DIAGNOSIS — C50.419 MALIGNANT NEOPLASM OF UPPER-OUTER QUADRANT OF FEMALE BREAST, UNSPECIFIED ESTROGEN RECEPTOR STATUS, UNSPECIFIED LATERALITY (HCC): ICD-10-CM

## 2019-04-02 DIAGNOSIS — L65.9 HAIR LOSS: ICD-10-CM

## 2019-04-02 DIAGNOSIS — G43.809 OTHER MIGRAINE WITHOUT STATUS MIGRAINOSUS, NOT INTRACTABLE: ICD-10-CM

## 2019-04-02 DIAGNOSIS — F99 PSYCHIATRIC PROBLEM: ICD-10-CM

## 2019-04-02 DIAGNOSIS — R09.81 NASAL CONGESTION: ICD-10-CM

## 2019-04-02 DIAGNOSIS — Z00.00 PREVENTATIVE HEALTH CARE: ICD-10-CM

## 2019-04-02 PROBLEM — C50.919 BREAST CA (HCC): Status: ACTIVE | Noted: 2019-04-02

## 2019-04-02 PROCEDURE — 99214 OFFICE O/P EST MOD 30 MIN: CPT | Performed by: FAMILY MEDICINE

## 2019-04-02 RX ORDER — TAMSULOSIN HYDROCHLORIDE 0.4 MG/1
0.4 CAPSULE ORAL
COMMUNITY
End: 2021-05-07

## 2019-04-02 ASSESSMENT — PATIENT HEALTH QUESTIONNAIRE - PHQ9: CLINICAL INTERPRETATION OF PHQ2 SCORE: 0

## 2019-04-02 NOTE — PROGRESS NOTES
Subjective:     CC:  Diagnoses of Preventative health care, Malignant neoplasm of upper-outer quadrant of female breast, unspecified estrogen receptor status, unspecified laterality (HCC), Hair loss, Psychiatric problem, Other migraine without status migrainosus, not intractable, and Nasal congestion were pertinent to this visit.    HISTORY OF THE PRESENT ILLNESS: Patient is a 69 y.o. female. This pleasant patient is here today:    History of breast cancer:  This is a resolved stable issue, new to me.  She has a history of left upper quadrant breast cancer status post lumpectomy.  She has completed chemotherapy and radiation.  She does self breast exams however refuses mammograms as she states it was not caught previously on a mammogram.  She is currently on an estradiol patch for hormone replacement therapy, she is unsure about the receptor status of this.    Lightheadedness 2/2 congestion:  This is a chronic unstable issue, new to me.  Patient states that she has a lightheadedness secondary to stuffiness and nasal congestion.  She tried the Claritin-D and the Flonase and it did help her.  She discontinued use temporarily however she feels as if she needs to restart because it makes her feel spacey when she is congested.    Psychiatric problem:  This is a chronic stable issue, new to me.  Patient is not willing to divulge her diagnoses however she states she has several psychiatric issues that she is seen by Dr. Nelson in psychiatry.  Per her prescription orders she is on Dextrostat.  Adult ADD is a likely diagnosis.    Hair loss:  This is a chronic unstable issue, new to me.  Patient has a history of hair loss for the last 10 years.  She states that she has had biopsies and a full workup and is trialed steroid medications.  She would not like to repeat the workup.  She believes her diagnosis related to lupus.  She states she now wears    Migraine:  This is a chronic stable issue, new to me.  Patient has a history of  migraines however she states that she uses Imitrex only 3 times a year.  She states her typical migraine has photophobia and phonophobia.  She denies any nausea or vomiting.    Health Maintenance:     Allergies: Patient has no known allergies.    Current Outpatient Prescriptions Ordered in [facility]   Medication Sig Dispense Refill   • Loratadine-Pseudoephedrine (CLARITIN-D 24 HOUR PO) Take  by mouth.     • tamsulosin (FLOMAX) 0.4 MG capsule Take 0.4 mg by mouth ONE-HALF HOUR AFTER BREAKFAST.     • zolpidem (AMBIEN) 10 MG Tab Take 1 Tab by mouth at bedtime as needed for Sleep. 10 Tab 0   • dextroamphetamine (DEXTROSTAT) 10 MG tablet TK 2 TS PO BID FOR 28 DAYS  0   • sumatriptan (IMITREX) 100 MG tablet TAKE 1 TABLET BY MOUTH AT ONSET OF MIGRAINE HEADACHE MAY REPEAT IN 2 HRS IF NEEDED  0   • fluticasone (FLONASE) 50 MCG/ACT nasal spray Spray 2 Sprays in nose every bedtime. (Patient not taking: Reported on 4/2/2019) 1 Bottle 1   • estradiol (CLIMARA) 0.05 MG/24HR PATCH WEEKLY 1 Patch. AS DIRECTED  0     No current [facility]-ordered facility-administered medications on file.        Past Medical History:   Diagnosis Date   • Backpain    • Breast CA (HCC)    • Bronchitis    • Cancer (HCC)     breast ca   • Migraine    • Other specified symptom associated with female genital organs     yeast infections   • Pneumonia    • Psychiatric problem     anxiety   • Renal disorder     acute renal failure       Past Surgical History:   Procedure Laterality Date   • LUMPECTOMY  1987   • OTHER      face lift       Social History   Substance Use Topics   • Smoking status: Former Smoker     Packs/day: 1.00     Years: 1.00     Quit date: 7/13/1974   • Smokeless tobacco: Never Used   • Alcohol use Yes      Comment: rarely       Social History     Social History Narrative   • No narrative on file       Family History   Problem Relation Age of Onset   • Cancer Mother    • Cancer Father    • Heart Disease Father        ROS:   Gen: no fevers/chills, no  "changes in weight  Eyes: no changes in vision  ENT: no sore throat, no hearing loss, no bloody nose  Pulm: no sob, no cough  CV: no chest pain, no palpitations  GI: no nausea/vomiting, no diarrhea  : no dysuria  MSk: no myalgias  Skin: no rash  Neuro: no headaches, no numbness/tingling  Heme/Lymph: no easy bruising      Objective:     Exam: Blood pressure 136/90, pulse 71, temperature 36.3 °C (97.3 °F), temperature source Temporal, height 1.626 m (5' 4\"), weight 62.5 kg (137 lb 12.8 oz), SpO2 97 %. Body mass index is 23.65 kg/m².    General: Normal appearing. No distress.  HEENT: Normocephalic. Eyes conjunctiva clear lids without ptosis, pupils equal  and reactive to light accommodation, ears normal shape and contour, canals are clear bilaterally, tympanic membranes are benign, nasal mucosa benign, oropharynx is without erythema, edema or exudates.   Neck: Supple without JVD or bruit. Thyroid is not enlarged.  Pulmonary: Clear to ausculation.  Normal effort. No rales, ronchi, or wheezing.  Cardiovascular: Regular rate and rhythm without murmur. Carotid and radial pulses are intact and equal bilaterally.  Abdomen: Soft, nontender, nondistended. Normal bowel sounds. Liver and spleen are not palpable  Neurologic: Grossly nonfocal  Lymph: No cervical or supraclavicular lymph nodes are palpable  Skin: Warm and dry.  No obvious lesions.  Musculoskeletal: Normal gait. No extremity cyanosis, clubbing, or edema.  Psych: Normal mood and affect. Alert and oriented x3. Judgment and insight is normal.    Assessment & Plan:   69 y.o. female with the following -    1. Preventative health care  She would not like to do any preventative care with the exception of labs.  - HEMOGLOBIN A1C; Future  - CBC WITH DIFFERENTIAL; Future  - Comp Metabolic Panel; Future  - Lipid Profile; Future  - TSH WITH REFLEX TO FT4; Future    2. Malignant neoplasm of upper-outer quadrant of female breast, unspecified estrogen receptor status, unspecified " laterality (HCC)  Patient declined any sort of surveillance.  I did discuss the estrogen patch she is on for hormone replacement therapy as I do not know her receptor positive status.  Patient understands the risks.  She would like to continue this.    3. Hair loss  Patient states she has completed workup and several treatments.  She would not like to further work this up.    4. Psychiatric problem  Unable to acquire detail of diagnoses.  Continue surveillance with psychiatry.    5. Other migraine without status migrainosus, not intractable  Resolved.  Patient can continue sumatriptan as needed.    6. Nasal congestion  Patient has had plastic surgery including a rhinoplasty.  She will continue surveillance with ENT.          Please note that this dictation was created using voice recognition software. I have made every reasonable attempt to correct obvious errors, but I expect that there are errors of grammar and possibly content that I did not discover before finalizing the note.

## 2019-04-02 NOTE — LETTER
[facility]  More Valero M.D.  [address]  [place] [address]  Fax: [phone]   Authorization for Release/Disclosure of   Protected Health Information   Name: BHAVANI JOHNSON : 1950 SSN: [id number]   Address:  [address]  [place] [address] Phone:    [phone] (home)    I authorize the entity listed below to release/disclose the PHI below to:   [facility]/More Valero M.D. and More Valero M.D.   Provider or Entity Name:     Address   City, State, Zip   Phone:      Fax:     Reason for request: continuity of care   Information to be released:    [  ] LAST COLONOSCOPY,  including any PATH REPORT and follow-up  [  ] LAST FIT/COLOGUARD RESULT [  ] LAST DEXA  [  ] LAST MAMMOGRAM  [  ] LAST PAP  [  ] LAST LABS [  ] RETINA EXAM REPORT  [  ] IMMUNIZATION RECORDS  [  ] Release all info      [  ] Check here and initial the line next to each item to release ALL health information INCLUDING  _____ Care and treatment for drug and / or alcohol abuse  _____ HIV testing, infection status, or AIDS  _____ Genetic Testing    DATES OF SERVICE OR TIME PERIOD TO BE DISCLOSED: _____________  I understand and acknowledge that:  * This Authorization may be revoked at any time by you in writing, except if your health information has already been used or disclosed.  * Your health information that will be used or disclosed as a result of you signing this authorization could be re-disclosed by the recipient. If this occurs, your re-disclosed health information may no longer be protected by State or Federal laws.  * You may refuse to sign this Authorization. Your refusal will not affect your ability to obtain treatment.  * This Authorization becomes effective upon signing and will  on (date) __________.      If no date is indicated, this Authorization will  one (1) year from the signature date.    Name: Bhavani Johnson    Signature:   Date:     2019       PLEASE FAX REQUESTED RECORDS BACK  TO: [phone]

## 2019-06-03 ENCOUNTER — OFFICE VISIT (OUTPATIENT)
Dept: MEDICAL GROUP | Facility: LAB | Age: 69
End: 2019-06-03
Payer: MEDICARE

## 2019-06-03 VITALS
OXYGEN SATURATION: 97 % | DIASTOLIC BLOOD PRESSURE: 90 MMHG | WEIGHT: 144.6 LBS | HEART RATE: 88 BPM | BODY MASS INDEX: 24.69 KG/M2 | HEIGHT: 64 IN | TEMPERATURE: 98 F | SYSTOLIC BLOOD PRESSURE: 138 MMHG

## 2019-06-03 DIAGNOSIS — R29.818 OTHER SYMPTOMS AND SIGNS INVOLVING THE NERVOUS SYSTEM: ICD-10-CM

## 2019-06-03 DIAGNOSIS — Z11.3 SCREENING FOR STD (SEXUALLY TRANSMITTED DISEASE): ICD-10-CM

## 2019-06-03 PROCEDURE — 99214 OFFICE O/P EST MOD 30 MIN: CPT | Performed by: FAMILY MEDICINE

## 2019-06-03 NOTE — PROGRESS NOTES
Subjective:     CC: Dizziness    HPI:   Aliza presents today with     Dizziness:  This is a chronic issue, new to me.  Patient unable to verbalize her current issue and is having trouble with word finding.  She does not have any weakness or facial droop.  She is tearful and unable to qualify and quantify exactly what is going on with her.  Per chart review she has had a history of dizziness that she states has been worked up multiple times and has had no relief.  Her last CT was without abnormality in her MRI in 2012 was without abnormality.  She was referred to an ENT and given meclizine and Compazine and has not worked for her.  She had positive nystagmus on her last urgent care visit.  All she is able to states that she is dizzy and her head is spinning.  Of significance she has a psychiatric history which she does not share and she states that she was on medications that she is not on as they do not make her feel good.  Her psychiatrist is Dr. Nelson.  She believes it is all related to her allergies but is unable to give a further history.  She just states that it goes and comes and then becomes tearful.    Past Medical History:   Diagnosis Date   • Backpain    • Breast CA (HCC)    • Bronchitis    • Cancer (HCC)     breast ca   • Migraine    • Other specified symptom associated with female genital organs     yeast infections   • Pneumonia    • Psychiatric problem     anxiety   • Renal disorder     acute renal failure       Social History   Substance Use Topics   • Smoking status: Former Smoker     Packs/day: 1.00     Years: 1.00     Quit date: 7/13/1974   • Smokeless tobacco: Never Used   • Alcohol use Yes      Comment: rarely       Current Outpatient Prescriptions Ordered in [facility]   Medication Sig Dispense Refill   • Loratadine-Pseudoephedrine (CLARITIN-D 24 HOUR PO) Take  by mouth.     • tamsulosin (FLOMAX) 0.4 MG capsule Take 0.4 mg by mouth ONE-HALF HOUR AFTER BREAKFAST.     • dextroamphetamine (DEXTROSTAT) 10  "MG tablet TK 2 TS PO BID FOR 28 DAYS  0   • sumatriptan (IMITREX) 100 MG tablet TAKE 1 TABLET BY MOUTH AT ONSET OF MIGRAINE HEADACHE MAY REPEAT IN 2 HRS IF NEEDED  0   • fluticasone (FLONASE) 50 MCG/ACT nasal spray Spray 2 Sprays in nose every bedtime. (Patient not taking: Reported on 4/2/2019) 1 Bottle 1   • estradiol (CLIMARA) 0.05 MG/24HR PATCH WEEKLY 1 Patch. AS DIRECTED  0   • zolpidem (AMBIEN) 10 MG Tab Take 1 Tab by mouth at bedtime as needed for Sleep. 10 Tab 0     No current Epic-ordered facility-administered medications on file.        Allergies:  Patient has no known allergies.    Health Maintenance: Completed    ROS:  Gen: no fevers/chill, no changes in weight  Eyes: no changes in vision  Neuro: \"Dizzy \"        Objective:       Exam:  /90 (BP Location: Left arm, Patient Position: Sitting)   Pulse 88   Temp 36.7 °C (98 °F) (Temporal)   Ht 1.626 m (5' 4\")   Wt 65.6 kg (144 lb 9.6 oz)   SpO2 97%   BMI 24.82 kg/m²  Body mass index is 24.82 kg/m².    Gen: Alert and oriented, No apparent distress.  Neck: Neck is supple without lymphadenopathy.  Lungs: Normal effort, CTA bilaterally, no wheezes, rhonchi, or rales  CV: Regular rate and rhythm. No murmurs, rubs, or gallops.               Ext: No clubbing, cyanosis, edema.  Neuro: Unable to participate in a neuro exam.  Tardive dyskinesia on exam    Assessment & Plan:     69 y.o. female with the following -     1.  Dizziness  My recommendation is to go to the ER for possible stroke work-up given her issues with word finding.  She is refusing and is refusing to sign an against medical advice paper.  She is refusing ambulance.  She is unable to give me a history of her dizziness and is very tearful.  I am unable to examine her.  She did agree to doing an MRI as well as her pending labs.  Strict ER precautions discussed.  She does have tardive dyskinesia that is apparent and part of her dizziness could be attributing from her history of vertigo or her " psychiatric issues.  Patient did not make follow-up appointment.  - MR-BRAIN-WITH & W/O; Future  - US-CAROTID DOPPLER BILAT; Future    2. Screening for STD (sexually transmitted disease)  Ordered today  - T.PALLIDUM AB EIA; Future  - HIV AG/AB COMBO ASSAY SCREENING; Future  - Chlamydia/GC PCR Urine Or Swab; Future        Please note that this dictation was created using voice recognition software. I have made every reasonable attempt to correct obvious errors, but I expect that there are errors of grammar and possibly content that I did not discover before finalizing the note.

## 2019-06-20 ENCOUNTER — HOSPITAL ENCOUNTER (OUTPATIENT)
Dept: RADIOLOGY | Facility: MEDICAL CENTER | Age: 69
End: 2019-06-20
Attending: FAMILY MEDICINE

## 2019-07-03 ENCOUNTER — OFFICE VISIT (OUTPATIENT)
Dept: URGENT CARE | Facility: CLINIC | Age: 69
End: 2019-07-03
Payer: MEDICARE

## 2019-07-03 VITALS
TEMPERATURE: 98.4 F | HEIGHT: 64 IN | SYSTOLIC BLOOD PRESSURE: 138 MMHG | WEIGHT: 145.2 LBS | DIASTOLIC BLOOD PRESSURE: 88 MMHG | HEART RATE: 92 BPM | BODY MASS INDEX: 24.79 KG/M2 | RESPIRATION RATE: 20 BRPM | OXYGEN SATURATION: 96 %

## 2019-07-03 DIAGNOSIS — G43.009 MIGRAINE WITHOUT AURA AND WITHOUT STATUS MIGRAINOSUS, NOT INTRACTABLE: ICD-10-CM

## 2019-07-03 PROCEDURE — 99214 OFFICE O/P EST MOD 30 MIN: CPT | Performed by: NURSE PRACTITIONER

## 2019-07-03 RX ORDER — SUMATRIPTAN 100 MG/1
100 TABLET, FILM COATED ORAL
Qty: 10 TAB | Refills: 3 | Status: SHIPPED | OUTPATIENT
Start: 2019-07-03

## 2019-07-03 ASSESSMENT — ENCOUNTER SYMPTOMS
PHOTOPHOBIA: 0
FEVER: 0
SENSORY CHANGE: 0
DOUBLE VISION: 0
TINGLING: 0
NAUSEA: 1
FOCAL WEAKNESS: 0
ABDOMINAL PAIN: 0
VOMITING: 0
WEAKNESS: 0
BLURRED VISION: 0
HEADACHES: 1
MIGRAINE HEADACHES: 1
VISUAL CHANGE: 0
LOSS OF BALANCE: 0
NUMBNESS: 0
SPEECH CHANGE: 0
DIZZINESS: 0

## 2019-07-04 NOTE — PROGRESS NOTES
Subjective:     Aliza Jacobson is a 69 y.o. female who presents for Migraine (patient would like imitrex refill for her migraines, on and off x20 years)      Headaches and using imitrex (pill form) since the . Refill . Similar to all headaches. Last migraine was over 4 months ago. No accessible PCP. Needs to establish a new one.      Headache    This is a recurrent problem. The current episode started yesterday. The pain is located in the bilateral region. The pain does not radiate. The pain quality is similar to prior headaches. The quality of the pain is described as squeezing. The pain is at a severity of 8/10. Associated symptoms include nausea. Pertinent negatives include no abdominal pain, blurred vision, dizziness, ear pain, fever, loss of balance, muscle aches, numbness, photophobia, tingling, visual change, vomiting or weakness. She has tried NSAIDs for the symptoms. The treatment provided moderate relief. Her past medical history is significant for migraine headaches. There is no history of hypertension or recent head traumas.       Past Medical History:   Diagnosis Date   • Backpain    • Breast CA (HCC)    • Bronchitis    • Cancer (HCC)     breast ca   • Migraine    • Other specified symptom associated with female genital organs     yeast infections   • Pneumonia    • Psychiatric problem     anxiety   • Renal disorder     acute renal failure       Past Surgical History:   Procedure Laterality Date   • LUMPECTOMY     • OTHER      face lift       Social History     Social History   • Marital status: Single     Spouse name: N/A   • Number of children: N/A   • Years of education: N/A     Occupational History   • Not on file.     Social History Main Topics   • Smoking status: Former Smoker     Packs/day: 1.00     Years: 1.00     Quit date: 1974   • Smokeless tobacco: Never Used   • Alcohol use Yes      Comment: rarely   • Drug use: No   • Sexual activity: No     Other Topics Concern   •  "Not on file     Social History Narrative   • No narrative on file        Family History   Problem Relation Age of Onset   • Cancer Mother    • Cancer Father    • Heart Disease Father         No Known Allergies    Review of Systems   Constitutional: Negative for fever.   HENT: Negative for congestion and ear pain.    Eyes: Negative for blurred vision, double vision and photophobia.   Gastrointestinal: Positive for nausea. Negative for abdominal pain and vomiting.   Neurological: Positive for headaches. Negative for dizziness, tingling, sensory change, speech change, focal weakness, weakness, numbness and loss of balance.   All other systems reviewed and are negative.       Objective:   /88 (BP Location: Left arm, Patient Position: Sitting, BP Cuff Size: Adult)   Pulse 92   Temp 36.9 °C (98.4 °F) (Temporal)   Resp 20   Ht 1.626 m (5' 4\")   Wt 65.9 kg (145 lb 3.2 oz)   SpO2 96%   BMI 24.92 kg/m²     Physical Exam   Constitutional: She is oriented to person, place, and time. She appears well-developed and well-nourished. No distress.   HENT:   Head: Normocephalic.   Right Ear: Tympanic membrane, external ear and ear canal normal.   Left Ear: Tympanic membrane, external ear and ear canal normal.   Nose: Nose normal.   Mouth/Throat: Uvula is midline, oropharynx is clear and moist and mucous membranes are normal.   Eyes: Pupils are equal, round, and reactive to light. Conjunctivae and EOM are normal.   Neck: Normal range of motion.   Cardiovascular: Normal rate.    Pulmonary/Chest: Effort normal and breath sounds normal.   Musculoskeletal: Normal range of motion.   Lymphadenopathy:     She has no cervical adenopathy.   Neurological: She is alert and oriented to person, place, and time. She has normal strength. No sensory deficit. GCS eye subscore is 4. GCS verbal subscore is 5. GCS motor subscore is 6.   Skin: Skin is warm and dry.   Psychiatric: She has a normal mood and affect. Her speech is normal and " behavior is normal. Judgment and thought content normal.       Assessment/Plan:   1. Migraine without aura and without status migrainosus, not intractable  - REFERRAL TO FOLLOW-UP WITH PRIMARY CARE  - sumatriptan (IMITREX) 100 MG tablet; Take 1 Tab by mouth Once PRN for Migraine for up to 1 dose.  Dispense: 10 Tab; Refill: 3    Follow up for changes in quality of headache, weakness, changes in speech, dizziness, or any other concerns.     Differential diagnosis, natural history, supportive care, and indications for immediate follow-up discussed.

## 2019-10-21 ENCOUNTER — HOSPITAL ENCOUNTER (OUTPATIENT)
Facility: MEDICAL CENTER | Age: 69
End: 2019-10-21
Attending: FAMILY MEDICINE
Payer: MEDICARE

## 2019-10-21 ENCOUNTER — OFFICE VISIT (OUTPATIENT)
Dept: URGENT CARE | Facility: CLINIC | Age: 69
End: 2019-10-21
Payer: MEDICARE

## 2019-10-21 VITALS
SYSTOLIC BLOOD PRESSURE: 112 MMHG | WEIGHT: 149 LBS | HEART RATE: 71 BPM | DIASTOLIC BLOOD PRESSURE: 74 MMHG | RESPIRATION RATE: 16 BRPM | BODY MASS INDEX: 25.58 KG/M2 | TEMPERATURE: 97 F | OXYGEN SATURATION: 100 %

## 2019-10-21 DIAGNOSIS — R30.0 DYSURIA: ICD-10-CM

## 2019-10-21 LAB
APPEARANCE UR: CLEAR
BILIRUB UR STRIP-MCNC: NORMAL MG/DL
COLOR UR AUTO: YELLOW
GLUCOSE UR STRIP.AUTO-MCNC: NORMAL MG/DL
KETONES UR STRIP.AUTO-MCNC: NORMAL MG/DL
LEUKOCYTE ESTERASE UR QL STRIP.AUTO: NORMAL
NITRITE UR QL STRIP.AUTO: NORMAL
PH UR STRIP.AUTO: 6.5 [PH] (ref 5–8)
PROT UR QL STRIP: NORMAL MG/DL
RBC UR QL AUTO: NORMAL
SP GR UR STRIP.AUTO: 1.01
UROBILINOGEN UR STRIP-MCNC: 0.2 MG/DL

## 2019-10-21 PROCEDURE — 87086 URINE CULTURE/COLONY COUNT: CPT

## 2019-10-21 PROCEDURE — 81002 URINALYSIS NONAUTO W/O SCOPE: CPT | Performed by: FAMILY MEDICINE

## 2019-10-21 PROCEDURE — 99214 OFFICE O/P EST MOD 30 MIN: CPT | Performed by: FAMILY MEDICINE

## 2019-10-21 RX ORDER — CEFDINIR 300 MG/1
300 CAPSULE ORAL EVERY 12 HOURS
Qty: 10 CAP | Refills: 0 | Status: SHIPPED | OUTPATIENT
Start: 2019-10-21 | End: 2019-10-26

## 2019-10-22 DIAGNOSIS — R30.0 DYSURIA: ICD-10-CM

## 2019-10-22 NOTE — PROGRESS NOTES
Subjective:      Aliza Jacobson is a 69 y.o. female who presents with Urinary Frequency (bloating and burning x2 or 3 days )      - This is a pleasant and non toxic appearing 69 y.o. female with c/o urinary freq and burning x 2-3 days, some suprapubic bloating. No NVFC             ALLERGIES:  Patient has no known allergies.     PMH:  Past Medical History:   Diagnosis Date   • Backpain    • Breast CA (HCC)    • Bronchitis    • Cancer (HCC)     breast ca   • Migraine    • Other specified symptom associated with female genital organs     yeast infections   • Pneumonia    • Psychiatric problem     anxiety   • Renal disorder     acute renal failure        PSH:  Past Surgical History:   Procedure Laterality Date   • LUMPECTOMY  1987   • OTHER      face lift       MEDS:    Current Outpatient Medications:   •  cefdinir (OMNICEF) 300 MG Cap, Take 1 Cap by mouth every 12 hours for 5 days., Disp: 10 Cap, Rfl: 0  •  sumatriptan (IMITREX) 100 MG tablet, Take 1 Tab by mouth Once PRN for Migraine for up to 1 dose., Disp: 10 Tab, Rfl: 3  •  Loratadine-Pseudoephedrine (CLARITIN-D 24 HOUR PO), Take  by mouth., Disp: , Rfl:   •  tamsulosin (FLOMAX) 0.4 MG capsule, Take 0.4 mg by mouth ONE-HALF HOUR AFTER BREAKFAST., Disp: , Rfl:   •  dextroamphetamine (DEXTROSTAT) 10 MG tablet, TK 2 TS PO BID FOR 28 DAYS, Disp: , Rfl: 0  •  sumatriptan (IMITREX) 100 MG tablet, TAKE 1 TABLET BY MOUTH AT ONSET OF MIGRAINE HEADACHE MAY REPEAT IN 2 HRS IF NEEDED, Disp: , Rfl: 0  •  fluticasone (FLONASE) 50 MCG/ACT nasal spray, Spray 2 Sprays in nose every bedtime. (Patient not taking: Reported on 4/2/2019), Disp: 1 Bottle, Rfl: 1  •  estradiol (CLIMARA) 0.05 MG/24HR PATCH WEEKLY, 1 Patch. AS DIRECTED, Disp: , Rfl: 0    ** I have documented what I find to be significant in regards to past medical, social, family and surgical history  in my HPI or under PMH/PSH/FH review section, otherwise it is contributory **           HPI    Review of Systems    Genitourinary: Positive for dysuria and frequency.   All other systems reviewed and are negative.         Objective:     /74   Pulse 71   Temp 36.1 °C (97 °F) (Temporal)   Resp 16   Wt 67.6 kg (149 lb)   SpO2 100%   BMI 25.58 kg/m²      Physical Exam   Constitutional: She appears well-developed and well-nourished. No distress.   HENT:   Head: Normocephalic and atraumatic.   Eyes: Conjunctivae are normal.   Cardiovascular: Normal heart sounds.   No murmur heard.  Pulmonary/Chest: Effort normal and breath sounds normal. No respiratory distress.   Neurological: She is alert. She exhibits normal muscle tone.   Skin: Skin is warm and dry. She is not diaphoretic.   Psychiatric: She has a normal mood and affect. Judgment normal.   Nursing note and vitals reviewed.              Assessment/Plan:         1. Dysuria  REFERRAL TO FAMILY PRACTICE    POCT Urinalysis    Urine Culture    cefdinir (OMNICEF) 300 MG Cap       - rest/hydrate       Dx & d/c instructions discussed w/ patient and/or family members.     Follow up with PCP (or here if PCP unavailable) in 2-3 days if symptoms not improving, ER if feeling/getting worse.    Any realistic and/or common medication side effects that may have been given today(i.e. Rash, GI upset/constipation, sedation, elevation of BP or blood sugars) reviewed.     Patient left in stable condition

## 2019-10-24 LAB
BACTERIA UR CULT: ABNORMAL
BACTERIA UR CULT: ABNORMAL
SIGNIFICANT IND 70042: ABNORMAL
SITE SITE: ABNORMAL
SOURCE SOURCE: ABNORMAL

## 2020-04-01 PROBLEM — G43.909 MIGRAINE: Status: ACTIVE | Noted: 2019-04-02

## 2021-01-15 DIAGNOSIS — Z23 NEED FOR VACCINATION: ICD-10-CM

## 2021-05-07 ENCOUNTER — APPOINTMENT (OUTPATIENT)
Dept: RADIOLOGY | Facility: MEDICAL CENTER | Age: 71
End: 2021-05-07
Attending: HOSPITALIST
Payer: MEDICARE

## 2021-05-07 ENCOUNTER — TELEPHONE (OUTPATIENT)
Dept: URGENT CARE | Facility: CLINIC | Age: 71
End: 2021-05-07

## 2021-05-07 ENCOUNTER — HOSPITAL ENCOUNTER (OUTPATIENT)
Facility: MEDICAL CENTER | Age: 71
End: 2021-05-08
Attending: EMERGENCY MEDICINE | Admitting: HOSPITALIST
Payer: MEDICARE

## 2021-05-07 ENCOUNTER — APPOINTMENT (OUTPATIENT)
Dept: RADIOLOGY | Facility: MEDICAL CENTER | Age: 71
End: 2021-05-07
Attending: EMERGENCY MEDICINE
Payer: MEDICARE

## 2021-05-07 DIAGNOSIS — G45.9 TIA (TRANSIENT ISCHEMIC ATTACK): ICD-10-CM

## 2021-05-07 DIAGNOSIS — R42 VERTIGO: ICD-10-CM

## 2021-05-07 DIAGNOSIS — R47.1 DYSARTHRIA: ICD-10-CM

## 2021-05-07 PROBLEM — I15.9 SECONDARY HYPERTENSION: Status: ACTIVE | Noted: 2021-05-07

## 2021-05-07 PROBLEM — Z66 DNR (DO NOT RESUSCITATE): Status: ACTIVE | Noted: 2021-05-07

## 2021-05-07 LAB
ANION GAP SERPL CALC-SCNC: 8 MMOL/L (ref 7–16)
APTT PPP: 34.3 SEC (ref 24.7–36)
BASOPHILS # BLD AUTO: 0.3 % (ref 0–1.8)
BASOPHILS # BLD: 0.02 K/UL (ref 0–0.12)
BUN SERPL-MCNC: 19 MG/DL (ref 8–22)
CALCIUM SERPL-MCNC: 9.2 MG/DL (ref 8.4–10.2)
CHLORIDE SERPL-SCNC: 107 MMOL/L (ref 96–112)
CO2 SERPL-SCNC: 25 MMOL/L (ref 20–33)
CREAT SERPL-MCNC: 1.18 MG/DL (ref 0.5–1.4)
EKG IMPRESSION: NORMAL
EOSINOPHIL # BLD AUTO: 0.1 K/UL (ref 0–0.51)
EOSINOPHIL NFR BLD: 1.7 % (ref 0–6.9)
ERYTHROCYTE [DISTWIDTH] IN BLOOD BY AUTOMATED COUNT: 46.3 FL (ref 35.9–50)
GLUCOSE SERPL-MCNC: 93 MG/DL (ref 65–99)
HCT VFR BLD AUTO: 40.7 % (ref 37–47)
HGB BLD-MCNC: 12.9 G/DL (ref 12–16)
IMM GRANULOCYTES # BLD AUTO: 0.02 K/UL (ref 0–0.11)
IMM GRANULOCYTES NFR BLD AUTO: 0.3 % (ref 0–0.9)
INR PPP: 0.97 (ref 0.87–1.13)
LYMPHOCYTES # BLD AUTO: 1.09 K/UL (ref 1–4.8)
LYMPHOCYTES NFR BLD: 18.7 % (ref 22–41)
MAGNESIUM SERPL-MCNC: 2.1 MG/DL (ref 1.5–2.5)
MCH RBC QN AUTO: 28.7 PG (ref 27–33)
MCHC RBC AUTO-ENTMCNC: 31.7 G/DL (ref 33.6–35)
MCV RBC AUTO: 90.4 FL (ref 81.4–97.8)
MONOCYTES # BLD AUTO: 0.51 K/UL (ref 0–0.85)
MONOCYTES NFR BLD AUTO: 8.8 % (ref 0–13.4)
NEUTROPHILS # BLD AUTO: 4.08 K/UL (ref 2–7.15)
NEUTROPHILS NFR BLD: 70.2 % (ref 44–72)
NRBC # BLD AUTO: 0 K/UL
NRBC BLD-RTO: 0 /100 WBC
PLATELET # BLD AUTO: 218 K/UL (ref 164–446)
PMV BLD AUTO: 11.8 FL (ref 9–12.9)
POTASSIUM SERPL-SCNC: 4.9 MMOL/L (ref 3.6–5.5)
PROTHROMBIN TIME: 12.6 SEC (ref 12–14.6)
RBC # BLD AUTO: 4.5 M/UL (ref 4.2–5.4)
SODIUM SERPL-SCNC: 140 MMOL/L (ref 135–145)
TROPONIN T SERPL-MCNC: 13 NG/L (ref 6–19)
WBC # BLD AUTO: 5.8 K/UL (ref 4.8–10.8)

## 2021-05-07 PROCEDURE — 700102 HCHG RX REV CODE 250 W/ 637 OVERRIDE(OP): Performed by: HOSPITALIST

## 2021-05-07 PROCEDURE — 85610 PROTHROMBIN TIME: CPT

## 2021-05-07 PROCEDURE — 93005 ELECTROCARDIOGRAM TRACING: CPT

## 2021-05-07 PROCEDURE — 99220 PR INITIAL OBSERVATION CARE,LEVL III: CPT | Performed by: HOSPITALIST

## 2021-05-07 PROCEDURE — 99285 EMERGENCY DEPT VISIT HI MDM: CPT

## 2021-05-07 PROCEDURE — 700105 HCHG RX REV CODE 258: Performed by: HOSPITALIST

## 2021-05-07 PROCEDURE — G0378 HOSPITAL OBSERVATION PER HR: HCPCS

## 2021-05-07 PROCEDURE — 83036 HEMOGLOBIN GLYCOSYLATED A1C: CPT

## 2021-05-07 PROCEDURE — 85730 THROMBOPLASTIN TIME PARTIAL: CPT

## 2021-05-07 PROCEDURE — 70450 CT HEAD/BRAIN W/O DYE: CPT | Mod: ME

## 2021-05-07 PROCEDURE — 93005 ELECTROCARDIOGRAM TRACING: CPT | Performed by: EMERGENCY MEDICINE

## 2021-05-07 PROCEDURE — 84484 ASSAY OF TROPONIN QUANT: CPT

## 2021-05-07 PROCEDURE — A9270 NON-COVERED ITEM OR SERVICE: HCPCS | Performed by: HOSPITALIST

## 2021-05-07 PROCEDURE — U0003 INFECTIOUS AGENT DETECTION BY NUCLEIC ACID (DNA OR RNA); SEVERE ACUTE RESPIRATORY SYNDROME CORONAVIRUS 2 (SARS-COV-2) (CORONAVIRUS DISEASE [COVID-19]), AMPLIFIED PROBE TECHNIQUE, MAKING USE OF HIGH THROUGHPUT TECHNOLOGIES AS DESCRIBED BY CMS-2020-01-R: HCPCS

## 2021-05-07 PROCEDURE — 80048 BASIC METABOLIC PNL TOTAL CA: CPT

## 2021-05-07 PROCEDURE — 85025 COMPLETE CBC W/AUTO DIFF WBC: CPT

## 2021-05-07 PROCEDURE — 83735 ASSAY OF MAGNESIUM: CPT

## 2021-05-07 RX ORDER — DIPHENHYDRAMINE HCL 25 MG
50 TABLET ORAL
COMMUNITY
End: 2023-06-20

## 2021-05-07 RX ORDER — OXYCODONE HYDROCHLORIDE 5 MG/1
5 TABLET ORAL EVERY 4 HOURS PRN
Status: DISCONTINUED | OUTPATIENT
Start: 2021-05-07 | End: 2021-05-08 | Stop reason: HOSPADM

## 2021-05-07 RX ORDER — IBUPROFEN 200 MG
800 TABLET ORAL EVERY 6 HOURS PRN
COMMUNITY

## 2021-05-07 RX ORDER — OMEPRAZOLE 20 MG/1
20 CAPSULE, DELAYED RELEASE ORAL EVERY EVENING
Status: DISCONTINUED | OUTPATIENT
Start: 2021-05-07 | End: 2021-05-08 | Stop reason: HOSPADM

## 2021-05-07 RX ORDER — ONDANSETRON 2 MG/ML
4 INJECTION INTRAMUSCULAR; INTRAVENOUS EVERY 4 HOURS PRN
Status: DISCONTINUED | OUTPATIENT
Start: 2021-05-07 | End: 2021-05-08 | Stop reason: HOSPADM

## 2021-05-07 RX ORDER — POLYETHYLENE GLYCOL 3350 17 G/17G
1 POWDER, FOR SOLUTION ORAL
Status: DISCONTINUED | OUTPATIENT
Start: 2021-05-07 | End: 2021-05-08 | Stop reason: HOSPADM

## 2021-05-07 RX ORDER — AMOXICILLIN AND CLAVULANATE POTASSIUM 875; 125 MG/1; MG/1
1 TABLET, FILM COATED ORAL 2 TIMES DAILY
COMMUNITY
End: 2021-06-03

## 2021-05-07 RX ORDER — LORATADINE 10 MG/1
10 TABLET ORAL EVERY EVENING
Status: DISCONTINUED | OUTPATIENT
Start: 2021-05-07 | End: 2021-05-08 | Stop reason: HOSPADM

## 2021-05-07 RX ORDER — ACETAMINOPHEN 325 MG/1
650 TABLET ORAL EVERY 4 HOURS PRN
Status: DISCONTINUED | OUTPATIENT
Start: 2021-05-07 | End: 2021-05-08 | Stop reason: HOSPADM

## 2021-05-07 RX ORDER — AMOXICILLIN 250 MG
2 CAPSULE ORAL 2 TIMES DAILY
Status: DISCONTINUED | OUTPATIENT
Start: 2021-05-08 | End: 2021-05-08 | Stop reason: HOSPADM

## 2021-05-07 RX ORDER — ACETAMINOPHEN 500 MG
500 TABLET ORAL EVERY 6 HOURS PRN
COMMUNITY

## 2021-05-07 RX ORDER — ZOLPIDEM TARTRATE 5 MG/1
10 TABLET ORAL NIGHTLY PRN
Status: DISCONTINUED | OUTPATIENT
Start: 2021-05-07 | End: 2021-05-08 | Stop reason: HOSPADM

## 2021-05-07 RX ORDER — DIPHENHYDRAMINE HCL 25 MG
25 TABLET ORAL EVERY 6 HOURS PRN
Status: DISCONTINUED | OUTPATIENT
Start: 2021-05-07 | End: 2021-05-08 | Stop reason: HOSPADM

## 2021-05-07 RX ORDER — HYDRALAZINE HYDROCHLORIDE 20 MG/ML
10 INJECTION INTRAMUSCULAR; INTRAVENOUS
Status: DISCONTINUED | OUTPATIENT
Start: 2021-05-07 | End: 2021-05-08 | Stop reason: HOSPADM

## 2021-05-07 RX ORDER — SUMATRIPTAN 25 MG/1
50 TABLET, FILM COATED ORAL
Status: DISCONTINUED | OUTPATIENT
Start: 2021-05-07 | End: 2021-05-08 | Stop reason: HOSPADM

## 2021-05-07 RX ORDER — ZOLPIDEM TARTRATE 10 MG/1
10 TABLET ORAL NIGHTLY PRN
COMMUNITY

## 2021-05-07 RX ORDER — LORATADINE 10 MG/1
10 TABLET ORAL EVERY EVENING
COMMUNITY

## 2021-05-07 RX ORDER — BISACODYL 10 MG
10 SUPPOSITORY, RECTAL RECTAL
Status: DISCONTINUED | OUTPATIENT
Start: 2021-05-07 | End: 2021-05-08 | Stop reason: HOSPADM

## 2021-05-07 RX ORDER — ATORVASTATIN CALCIUM 40 MG/1
80 TABLET, FILM COATED ORAL EVERY EVENING
Status: DISCONTINUED | OUTPATIENT
Start: 2021-05-07 | End: 2021-05-08 | Stop reason: HOSPADM

## 2021-05-07 RX ORDER — FLUTICASONE PROPIONATE 50 MCG
2 SPRAY, SUSPENSION (ML) NASAL
Status: DISCONTINUED | OUTPATIENT
Start: 2021-05-07 | End: 2021-05-08 | Stop reason: HOSPADM

## 2021-05-07 RX ORDER — ASPIRIN 325 MG
325 TABLET ORAL DAILY
Status: DISCONTINUED | OUTPATIENT
Start: 2021-05-08 | End: 2021-05-08 | Stop reason: HOSPADM

## 2021-05-07 RX ORDER — SODIUM CHLORIDE, SODIUM LACTATE, POTASSIUM CHLORIDE, CALCIUM CHLORIDE 600; 310; 30; 20 MG/100ML; MG/100ML; MG/100ML; MG/100ML
INJECTION, SOLUTION INTRAVENOUS CONTINUOUS
Status: DISCONTINUED | OUTPATIENT
Start: 2021-05-07 | End: 2021-05-08 | Stop reason: HOSPADM

## 2021-05-07 RX ORDER — OMEPRAZOLE 20 MG/1
20 CAPSULE, DELAYED RELEASE ORAL EVERY EVENING
COMMUNITY
End: 2023-09-26 | Stop reason: CLARIF

## 2021-05-07 RX ORDER — ONDANSETRON 4 MG/1
4 TABLET, ORALLY DISINTEGRATING ORAL EVERY 4 HOURS PRN
Status: DISCONTINUED | OUTPATIENT
Start: 2021-05-07 | End: 2021-05-08 | Stop reason: HOSPADM

## 2021-05-07 RX ORDER — ASPIRIN 325 MG
650 TABLET ORAL EVERY 6 HOURS PRN
COMMUNITY
End: 2021-06-03

## 2021-05-07 RX ORDER — LABETALOL HYDROCHLORIDE 5 MG/ML
10 INJECTION, SOLUTION INTRAVENOUS EVERY 4 HOURS PRN
Status: DISCONTINUED | OUTPATIENT
Start: 2021-05-07 | End: 2021-05-08 | Stop reason: HOSPADM

## 2021-05-07 RX ORDER — ESTRADIOL 0.05 MG/D
1 PATCH TRANSDERMAL
Status: DISCONTINUED | OUTPATIENT
Start: 2021-05-11 | End: 2021-05-08 | Stop reason: HOSPADM

## 2021-05-07 RX ADMIN — OMEPRAZOLE 20 MG: 20 CAPSULE, DELAYED RELEASE ORAL at 18:27

## 2021-05-07 RX ADMIN — LORATADINE 10 MG: 10 TABLET ORAL at 18:27

## 2021-05-07 RX ADMIN — ATORVASTATIN CALCIUM 80 MG: 40 TABLET, FILM COATED ORAL at 18:27

## 2021-05-07 RX ADMIN — ZOLPIDEM TARTRATE 10 MG: 5 TABLET, COATED ORAL at 23:11

## 2021-05-07 RX ADMIN — SODIUM CHLORIDE, POTASSIUM CHLORIDE, SODIUM LACTATE AND CALCIUM CHLORIDE: 600; 310; 30; 20 INJECTION, SOLUTION INTRAVENOUS at 18:29

## 2021-05-07 ASSESSMENT — ENCOUNTER SYMPTOMS
HALLUCINATIONS: 1
CARDIOVASCULAR NEGATIVE: 1
DIZZINESS: 1
DIAPHORESIS: 0
PALPITATIONS: 0
CONSTIPATION: 0
NERVOUS/ANXIOUS: 0
HEARTBURN: 0
FOCAL WEAKNESS: 0
FEVER: 0
COUGH: 0
CHILLS: 0
DOUBLE VISION: 0
HEADACHES: 1
WEAKNESS: 1
DIARRHEA: 0
HEMOPTYSIS: 0
GASTROINTESTINAL NEGATIVE: 1
VOMITING: 0
BLOOD IN STOOL: 0
SPEECH CHANGE: 1
WHEEZING: 0
EYES NEGATIVE: 1
RESPIRATORY NEGATIVE: 1
ABDOMINAL PAIN: 0
NAUSEA: 0
BRUISES/BLEEDS EASILY: 0
MUSCULOSKELETAL NEGATIVE: 1
LOSS OF CONSCIOUSNESS: 0
SEIZURES: 0

## 2021-05-07 ASSESSMENT — COGNITIVE AND FUNCTIONAL STATUS - GENERAL
SUGGESTED CMS G CODE MODIFIER MOBILITY: CK
DAILY ACTIVITIY SCORE: 18
MOVING FROM LYING ON BACK TO SITTING ON SIDE OF FLAT BED: A LITTLE
MOBILITY SCORE: 18
WALKING IN HOSPITAL ROOM: A LITTLE
TURNING FROM BACK TO SIDE WHILE IN FLAT BAD: A LITTLE
EATING MEALS: A LITTLE
PERSONAL GROOMING: A LITTLE
TOILETING: A LITTLE
MOVING TO AND FROM BED TO CHAIR: A LITTLE
STANDING UP FROM CHAIR USING ARMS: A LITTLE
HELP NEEDED FOR BATHING: A LITTLE
CLIMB 3 TO 5 STEPS WITH RAILING: A LITTLE
DRESSING REGULAR LOWER BODY CLOTHING: A LITTLE
DRESSING REGULAR UPPER BODY CLOTHING: A LITTLE
SUGGESTED CMS G CODE MODIFIER DAILY ACTIVITY: CK

## 2021-05-07 ASSESSMENT — LIFESTYLE VARIABLES
EVER HAD A DRINK FIRST THING IN THE MORNING TO STEADY YOUR NERVES TO GET RID OF A HANGOVER: NO
TOTAL SCORE: 0
HAVE YOU EVER FELT YOU SHOULD CUT DOWN ON YOUR DRINKING: NO
TOTAL SCORE: 0
CONSUMPTION TOTAL: NEGATIVE
TOTAL SCORE: 0
EVER FELT BAD OR GUILTY ABOUT YOUR DRINKING: NO
ALCOHOL_USE: YES
ON A TYPICAL DAY WHEN YOU DRINK ALCOHOL HOW MANY DRINKS DO YOU HAVE: 1
HOW MANY TIMES IN THE PAST YEAR HAVE YOU HAD 5 OR MORE DRINKS IN A DAY: 0
AVERAGE NUMBER OF DAYS PER WEEK YOU HAVE A DRINK CONTAINING ALCOHOL: 1
HAVE PEOPLE ANNOYED YOU BY CRITICIZING YOUR DRINKING: NO

## 2021-05-07 ASSESSMENT — VISUAL ACUITY: OU: 1

## 2021-05-07 ASSESSMENT — PATIENT HEALTH QUESTIONNAIRE - PHQ9
1. LITTLE INTEREST OR PLEASURE IN DOING THINGS: NOT AT ALL
SUM OF ALL RESPONSES TO PHQ9 QUESTIONS 1 AND 2: 0
2. FEELING DOWN, DEPRESSED, IRRITABLE, OR HOPELESS: NOT AT ALL
SUM OF ALL RESPONSES TO PHQ9 QUESTIONS 1 AND 2: 0
2. FEELING DOWN, DEPRESSED, IRRITABLE, OR HOPELESS: NOT AT ALL
1. LITTLE INTEREST OR PLEASURE IN DOING THINGS: NOT AT ALL

## 2021-05-07 ASSESSMENT — PAIN DESCRIPTION - PAIN TYPE: TYPE: ACUTE PAIN

## 2021-05-07 NOTE — TELEPHONE ENCOUNTER
"I was able to contact pt at home. I advised pt that a \"TIA\" or stroke is a medical emergency and that she needs to proceed directly to the ER and offered to call 911.     Pt stated that \"a TIA is different from a stroke\" and the symptoms are all gone now. I stated again that this is still a medical emergency and to proceed to ER. Pt stated that she \"can't go to the ER\" and asked that we cancel her urgent care appointment.   "

## 2021-05-07 NOTE — TELEPHONE ENCOUNTER
Spoke to patient's son at ~2:20 pm. He asked several questions about going to the ER vs. Urgent Care. I said that we of course would see any patient who needed care, but that her symptoms indicated that evaluation at the ER was more appropriate, as they could do imaging right away and monitor her continuously. I said that we would almost certainly send her to the ER anyway after any evaluation we did, and that we didn't have the appropriate resources to manage her symptoms. Patient's son understood and said he would urge her to go to the ER.

## 2021-05-07 NOTE — TELEPHONE ENCOUNTER
Pt's son returned call (he is [place]) and was unable to contact pt. I will continue to attempt to contact her.

## 2021-05-07 NOTE — TELEPHONE ENCOUNTER
"Called to clarify visit reason for urgent care today which was listed as \"Possible TIA.\" Left our direct line.  "

## 2021-05-07 NOTE — ED PROVIDER NOTES
"ED Provider Note    CHIEF COMPLAINT  Chief Complaint   Patient presents with   • Dizziness     Reports that this morning at \"11 or 12 oclock I got very dizzy, and I tried to talk to someone and babbling came out\". Reports this lasted \" a couple mins\" and states that these symp[toms have since resolved.        HPI  Aliza Jacobsno is a 71 y.o. female who presents to the emergency department with an episode of dizziness and garbled speech.  The patient says that this morning around 11:00 she had a 1 to 2-minute episode where the world seem to be spinning or moving.  The walls were moving.  She tried to speak and she had very garbled speech that was nonsensical.  This resolved after about a minute.  She now is back to baseline.  Speech is normal.  No ongoing vertigo.  However she spoke with some friends and did some research and felt that she should come to the emergency department for evaluation.  No history of similar.  No history of strokes.  No headache.  No weakness.    REVIEW OF SYSTEMS  See HPI for further details. All other systems are negative.     PAST MEDICAL HISTORY  Past Medical History:   Diagnosis Date   • Backpain    • Breast CA (HCC)    • Bronchitis    • Cancer (HCC)     breast ca   • Migraine    • Other specified symptom associated with female genital organs     yeast infections   • Pneumonia    • Psychiatric problem     anxiety   • Renal disorder     acute renal failure       FAMILY HISTORY  Family History   Problem Relation Age of Onset   • Cancer Mother    • Cancer Father    • Heart Disease Father        SOCIAL HISTORY  Social History     Socioeconomic History   • Marital status: Single     Spouse name: Not on file   • Number of children: Not on file   • Years of education: Not on file   • Highest education level: Not on file   Occupational History   • Not on file   Tobacco Use   • Smoking status: Former Smoker     Packs/day: 1.00     Years: 1.00     Pack years: 1.00     Quit date: 7/13/1974     " Years since quittin.8   • Smokeless tobacco: Never Used   Substance and Sexual Activity   • Alcohol use: Yes     Comment: rarely   • Drug use: No   • Sexual activity: Never     Partners: Male   Other Topics Concern   • Not on file   Social History Narrative   • Not on file     Social Determinants of Health     Financial Resource Strain:    • Difficulty of Paying Living Expenses:    Food Insecurity:    • Worried About Running Out of Food in the Last Year:    • Ran Out of Food in the Last Year:    Transportation Needs:    • Lack of Transportation (Medical):    • Lack of Transportation (Non-Medical):    Physical Activity:    • Days of Exercise per Week:    • Minutes of Exercise per Session:    Stress:    • Feeling of Stress :    Social Connections:    • Frequency of Communication with Friends and Family:    • Frequency of Social Gatherings with Friends and Family:    • Attends Orthodoxy Services:    • Active Member of Clubs or Organizations:    • Attends Club or Organization Meetings:    • Marital Status:    Intimate Partner Violence:    • Fear of Current or Ex-Partner:    • Emotionally Abused:    • Physically Abused:    • Sexually Abused:        SURGICAL HISTORY  Past Surgical History:   Procedure Laterality Date   • LUMPECTOMY     • OTHER      face lift       CURRENT MEDICATIONS  Home Medications       Reviewed by Miracle Malik (Pharmacy Tech) on 21 at 1618  Med List Status: Complete     Medication Last Dose Status   acetaminophen (TYLENOL) 500 MG Tab 2021 Active   amoxicillin-clavulanate (AUGMENTIN) 875-125 MG Tab last week Active   aspirin (ASA) 325 MG Tab 2021 Active   diphenhydrAMINE (BENADRYL) 25 MG Tab 2021 Active   estradiol (CLIMARA) 0.05 MG/24HR PATCH WEEKLY 2021 Active   fluticasone (FLONASE) 50 MCG/ACT nasal spray 2021 Active   ibuprofen (MOTRIN) 200 MG Tab 2021 Active   loratadine (CLARITIN) 10 MG Tab 2021 Active   omeprazole (PRILOSEC) 20 MG  "delayed-release capsule 5/6/2021 Active   sumatriptan (IMITREX) 100 MG tablet last week Active   zolpidem (AMBIEN) 10 MG Tab 5/6/2021 Active                    ALLERGIES  No Known Allergies    PHYSICAL EXAM  VITAL SIGNS: /90   Pulse 83   Temp 36.8 °C (98.3 °F) (Temporal)   Resp 16   Ht 1.6 m (5' 3\")   Wt 66.5 kg (146 lb 9.7 oz)   SpO2 96%   BMI 25.97 kg/m²   Constitutional: Well developed, Well nourished, No acute distress, Non-toxic appearance.  Sitting in bed.  Reading a novel.  HENT: Normocephalic, atraumatic, oropharynx is moist.  No facial droop.  Eyes: Pupils are equal round reactive to light.  Extraocular movements are intact.  No ptosis.  Neck: Normal range of motion, No tenderness, Supple, No stridor.   Cardiovascular: Normal heart rate, Normal rhythm, No murmurs, No rubs, No gallops.   Thorax & Lungs: Normal breath sounds, No respiratory distress, No wheezing, No chest tenderness.   Abdomen: Bowel sounds normal, Soft, No tenderness, No masses, No pulsatile masses.   Skin: Warm, Dry, No erythema, No rash.   Back: No tenderness, No CVA tenderness.   Extremities: Intact distal pulses, No edema, No tenderness, No cyanosis, No clubbing.   Neurologic: Cranial nerves II through XII are intact.  Normal speech and language.  NIH stroke scale score of 0.  Psychiatric: Affect normal, Judgment normal, Mood normal.     EKG  Interpreted by me.  See below.    RADIOLOGY/PROCEDURES  CT-HEAD W/O   Final Result      1.  No CT evidence of acute infarct, hemorrhage or mass.   2.  Mild atrophy and chronic small vessel ischemic changes.        Results for orders placed or performed during the hospital encounter of 05/07/21   CBC WITH DIFFERENTIAL   Result Value Ref Range    WBC 5.8 4.8 - 10.8 K/uL    RBC 4.50 4.20 - 5.40 M/uL    Hemoglobin 12.9 12.0 - 16.0 g/dL    Hematocrit 40.7 37.0 - 47.0 %    MCV 90.4 81.4 - 97.8 fL    MCH 28.7 27.0 - 33.0 pg    MCHC 31.7 (L) 33.6 - 35.0 g/dL    RDW 46.3 35.9 - 50.0 fL    " Platelet Count 218 164 - 446 K/uL    MPV 11.8 9.0 - 12.9 fL    Neutrophils-Polys 70.20 44.00 - 72.00 %    Lymphocytes 18.70 (L) 22.00 - 41.00 %    Monocytes 8.80 0.00 - 13.40 %    Eosinophils 1.70 0.00 - 6.90 %    Basophils 0.30 0.00 - 1.80 %    Immature Granulocytes 0.30 0.00 - 0.90 %    Nucleated RBC 0.00 /100 WBC    Neutrophils (Absolute) 4.08 2.00 - 7.15 K/uL    Lymphs (Absolute) 1.09 1.00 - 4.80 K/uL    Monos (Absolute) 0.51 0.00 - 0.85 K/uL    Eos (Absolute) 0.10 0.00 - 0.51 K/uL    Baso (Absolute) 0.02 0.00 - 0.12 K/uL    Immature Granulocytes (abs) 0.02 0.00 - 0.11 K/uL    NRBC (Absolute) 0.00 K/uL   EKG   Result Value Ref Range    Report       [facility] Emergency Dept.    Test Date:  2021  Pt Name:    BHAVANI JOHNSON               Department: EDS  MRN:        [id number]                      Room:       -ROOM 5  Gender:     Female                       Technician: CAITLIN  :        [id number]                   Requested By:ER TRIAGE PROTOCOL  Order #:    [id number]                    Reading MD:    Measurements  Intervals                                Axis  Rate:       73                           P:          59  NH:         133                          QRS:        29  QRSD:       97                           T:          15  QT:         389  QTc:        429    Interpretive Statements  Sinus rhythm  Baseline wander in lead(s) V4,V5  Compared to ECG 2019 21:36:23  ST (T wave) deviation no longer present  T-wave abnormality no longer present  Q waves no longer present           COURSE & MEDICAL DECISION MAKING  Pertinent Labs & Imaging studies reviewed. (See chart for details)    The patient presents today with an episode of vertigo and dysarthria.  Symptoms have now resolved.  Patient is not an alteplase candidate as her stroke scale score is 0.    Concerned that this may represent a transient ischemic attack.  Differential diagnosis includes TIA, mass, intracranial  hemorrhage, electrolyte derangement.    IV is established.  Laboratory studies are obtained.    Laboratory studies are unrevealing.  CT scan shows no evidence of intracranial hemorrhage or mass-effect.    Urgency department evaluation is unrevealing.  She will be admitted to the hospital for further evaluation and treatment.  I spoke with the on-call hospitalist for admission.    FINAL IMPRESSION  1. Vertigo    2. Dysarthria              Electronically signed by: Jarret Meeks M.D., 5/7/2021 4:32 PM

## 2021-05-07 NOTE — ED NOTES
Pharmacy Medication Reconciliation      Medication reconciliation updated and complete per pt at bedside  Allergies have been verified  Patient home pharmacy:[facility]    Pt reports she finished a 10 day course of Augmentin last week sometime

## 2021-05-08 ENCOUNTER — APPOINTMENT (OUTPATIENT)
Dept: CARDIOLOGY | Facility: MEDICAL CENTER | Age: 71
End: 2021-05-08
Attending: HOSPITALIST
Payer: MEDICARE

## 2021-05-08 ENCOUNTER — APPOINTMENT (OUTPATIENT)
Dept: RADIOLOGY | Facility: MEDICAL CENTER | Age: 71
End: 2021-05-08
Attending: HOSPITALIST
Payer: MEDICARE

## 2021-05-08 VITALS
RESPIRATION RATE: 18 BRPM | SYSTOLIC BLOOD PRESSURE: 145 MMHG | OXYGEN SATURATION: 100 % | HEIGHT: 63 IN | TEMPERATURE: 97.3 F | WEIGHT: 150.13 LBS | BODY MASS INDEX: 26.6 KG/M2 | DIASTOLIC BLOOD PRESSURE: 80 MMHG | HEART RATE: 66 BPM

## 2021-05-08 LAB
ANION GAP SERPL CALC-SCNC: 10 MMOL/L (ref 7–16)
BUN SERPL-MCNC: 14 MG/DL (ref 8–22)
CALCIUM SERPL-MCNC: 8.5 MG/DL (ref 8.4–10.2)
CHLORIDE SERPL-SCNC: 109 MMOL/L (ref 96–112)
CHOLEST SERPL-MCNC: 157 MG/DL (ref 100–199)
CO2 SERPL-SCNC: 20 MMOL/L (ref 20–33)
CREAT SERPL-MCNC: 0.93 MG/DL (ref 0.5–1.4)
ERYTHROCYTE [DISTWIDTH] IN BLOOD BY AUTOMATED COUNT: 46.1 FL (ref 35.9–50)
GLUCOSE SERPL-MCNC: 82 MG/DL (ref 65–99)
HCT VFR BLD AUTO: 37.6 % (ref 37–47)
HDLC SERPL-MCNC: 42 MG/DL
HGB BLD-MCNC: 11.8 G/DL (ref 12–16)
LDLC SERPL CALC-MCNC: 78 MG/DL
LV EJECT FRACT  99904: 75
LV EJECT FRACT MOD 2C 99903: 84.94
LV EJECT FRACT MOD 4C 99902: 83.97
LV EJECT FRACT MOD BP 99901: 84.24
MCH RBC QN AUTO: 28.4 PG (ref 27–33)
MCHC RBC AUTO-ENTMCNC: 31.4 G/DL (ref 33.6–35)
MCV RBC AUTO: 90.6 FL (ref 81.4–97.8)
PLATELET # BLD AUTO: 188 K/UL (ref 164–446)
PMV BLD AUTO: 11.5 FL (ref 9–12.9)
POTASSIUM SERPL-SCNC: 3.7 MMOL/L (ref 3.6–5.5)
RBC # BLD AUTO: 4.15 M/UL (ref 4.2–5.4)
SODIUM SERPL-SCNC: 139 MMOL/L (ref 135–145)
TRIGL SERPL-MCNC: 184 MG/DL (ref 0–149)
WBC # BLD AUTO: 6.1 K/UL (ref 4.8–10.8)

## 2021-05-08 PROCEDURE — 99217 PR OBSERVATION CARE DISCHARGE: CPT | Performed by: INTERNAL MEDICINE

## 2021-05-08 PROCEDURE — 85027 COMPLETE CBC AUTOMATED: CPT

## 2021-05-08 PROCEDURE — 93306 TTE W/DOPPLER COMPLETE: CPT

## 2021-05-08 PROCEDURE — 80061 LIPID PANEL: CPT

## 2021-05-08 PROCEDURE — 70498 CT ANGIOGRAPHY NECK: CPT | Mod: MG

## 2021-05-08 PROCEDURE — 80048 BASIC METABOLIC PNL TOTAL CA: CPT

## 2021-05-08 PROCEDURE — G0378 HOSPITAL OBSERVATION PER HR: HCPCS

## 2021-05-08 PROCEDURE — 93306 TTE W/DOPPLER COMPLETE: CPT | Mod: 26 | Performed by: INTERNAL MEDICINE

## 2021-05-08 PROCEDURE — 70551 MRI BRAIN STEM W/O DYE: CPT | Mod: ME

## 2021-05-08 PROCEDURE — 70496 CT ANGIOGRAPHY HEAD: CPT | Mod: MG

## 2021-05-08 PROCEDURE — A9270 NON-COVERED ITEM OR SERVICE: HCPCS | Performed by: HOSPITALIST

## 2021-05-08 PROCEDURE — 700117 HCHG RX CONTRAST REV CODE 255: Performed by: HOSPITALIST

## 2021-05-08 PROCEDURE — 700102 HCHG RX REV CODE 250 W/ 637 OVERRIDE(OP): Performed by: HOSPITALIST

## 2021-05-08 RX ORDER — ASPIRIN 81 MG/1
81 TABLET, CHEWABLE ORAL DAILY
Qty: 100 TABLET | COMMUNITY
Start: 2021-05-08

## 2021-05-08 RX ORDER — ROSUVASTATIN CALCIUM 10 MG/1
10 TABLET, COATED ORAL EVERY EVENING
Qty: 30 TABLET | Refills: 1 | Status: SHIPPED | OUTPATIENT
Start: 2021-05-08

## 2021-05-08 RX ADMIN — ASPIRIN 325 MG ORAL TABLET 325 MG: 325 PILL ORAL at 05:32

## 2021-05-08 RX ADMIN — IOHEXOL 80 ML: 350 INJECTION, SOLUTION INTRAVENOUS at 08:58

## 2021-05-08 NOTE — ASSESSMENT & PLAN NOTE
Discussed CODE STATUS with patient she is very adamant about being DO NOT RESUSCITATE CODE STATUS.

## 2021-05-08 NOTE — PROGRESS NOTES
"0830 - Prepared pt to go to CT and MRI. Pt making multiple comments including \"It's like fucking Brisa in here\" and \"When I come back, I want this fucking IV out because I am leaving.\" Pt was educated that her comments were inappropriate, that she is in a hospital in [place] for TIA evaluation. This RN also reassured the pt that the 20g RAC IV would be removed when she returns from imaging    0915 - Pt back to unit. Pt refused to allow this RN perform a full assessment -- see flowsheet of limited assessment. In an attempt to reconnect IV fluids, pt expressed the desire to leave AMA. Dr. Ramey notified and at bedside. PIVx2 removed and telebox removed. Pt escorted off premises by this RN. Pt continuing to make comments including \"You guys just costed my insurance a lot of money.\" Pt understands she is leaving AMA and verbalized that she is aware of the risks in doing so.  "

## 2021-05-08 NOTE — PROGRESS NOTES
"Supervisor in to speak with pt per pt request. Pt upset that she was \"lied to.\" Pt was under the impression that she would be having her CTA, MRI, and echo all completed today and released home. POC discussed, questions answered, including reasonable timeframes for test completion tomorrow, after being monitored overnight. Pt stated that she did not want to remain in the hospital, as she had a previous negative experience. Pt is also unhappy with PIV, stating it is painful. The need for PIV for MRI was explained. Pt was told PIV can be removed if in a painful location but another would need to be inserted prior to exam. Therapeutic communication utilized. Pt decided to stay for the ordered tests, stating that the event she experienced, which brought her to the hospital was scary.      "

## 2021-05-08 NOTE — PROGRESS NOTES
Reached out to patient phone number [phone], immediately goes to voice mail.  [phone] - number disconnected.    1:50pm - spoke with Aliza and notified her of old lacunar infarct seen on MRI - told her to take ASA 81mg daily and sent RX for Crestor 10mg daily to [facility] on [facility].

## 2021-05-08 NOTE — H&P
"Hospital Medicine History & Physical Note    Date of Service  5/7/2021    Primary Care Physician  Pcp Pt States None    Consultants  None    Code Status  DNAR/DNI    Chief Complaint  Chief Complaint   Patient presents with   • Dizziness     Reports that this morning at \"11 or 12 oclock I got very dizzy, and I tried to talk to someone and babbling came out\". Reports this lasted \" a couple mins\" and states that these symp[toms have since resolved.        History of Presenting Illness  71 y.o. female who presented 5/7/2021 with visual hallucination along with speech disturbance.  The patient at this point may have had a TIA.  Patient will be admitted under telemetric monitoring will undergo serial neurological monitoring.  Patient will undergo an MRI of the head CTA of the head and neck as well as an echocardiogram.  She is in agreement with performing these tests.  In the meantime patient will be on aspirin and statin.  We will also gently hydrate her.  Avoid nephrotoxic medications allow permissive hypertension.    Review of Systems  Review of Systems   Constitutional: Positive for malaise/fatigue. Negative for chills, diaphoresis and fever.   HENT: Negative.    Eyes: Negative.  Negative for double vision.   Respiratory: Negative.  Negative for cough, hemoptysis and wheezing.    Cardiovascular: Negative.  Negative for chest pain, palpitations and leg swelling.   Gastrointestinal: Negative.  Negative for abdominal pain, blood in stool, constipation, diarrhea, heartburn, nausea and vomiting.   Genitourinary: Negative.  Negative for frequency, hematuria and urgency.   Musculoskeletal: Negative.  Negative for joint pain.   Skin: Negative.  Negative for itching and rash.   Neurological: Positive for dizziness, speech change, weakness and headaches. Negative for focal weakness, seizures and loss of consciousness.   Endo/Heme/Allergies: Negative.  Does not bruise/bleed easily.   Psychiatric/Behavioral: Positive for " hallucinations. Negative for suicidal ideas. The patient is not nervous/anxious.    All other systems reviewed and are negative.      Past Medical History   has a past medical history of Backpain, Breast CA (HCC), Bronchitis, Cancer (HCC), Migraine, Other specified symptom associated with female genital organs, Pneumonia, Psychiatric problem, Renal disorder, and Secondary hypertension (5/7/2021).    Surgical History   has a past surgical history that includes lumpectomy (1987) and other.     Family History  family history includes Cancer in her father and mother; Heart Disease in her father.     Social History   reports that she quit smoking about 46 years ago. She has a 1.00 pack-year smoking history. She has never used smokeless tobacco. She reports current alcohol use. She reports that she does not use drugs.  Patient has extensive history of drug use in the past.  She used marijuana very avidly in her youth.  She tried marijuana recently since it became legal but it had no effect on her so she abandoned that idea.  She says she also tried some heart drugs back in college.    Allergies  No Known Allergies    Medications  Prior to Admission Medications   Prescriptions Last Dose Informant Patient Reported? Taking?   acetaminophen (TYLENOL) 500 MG Tab 5/7/2021 at 1200 Patient Yes Yes   Sig: Take 500 mg by mouth every 6 hours as needed for Moderate Pain.   amoxicillin-clavulanate (AUGMENTIN) 875-125 MG Tab last week at finished Patient Yes Yes   Sig: Take 1 tablet by mouth 2 times a day. 10 day course   aspirin (ASA) 325 MG Tab 5/7/2021 at am Patient Yes Yes   Sig: Take 650 mg by mouth every 6 hours as needed for Mild Pain or Inflammation.   diphenhydrAMINE (BENADRYL) 25 MG Tab 5/6/2021 at prn Patient Yes Yes   Sig: Take 50 mg by mouth 1 time a day as needed (allergy).   estradiol (CLIMARA) 0.05 MG/24HR PATCH WEEKLY 5/4/2021 at unk Patient Yes No   Sig: Place 1 Patch on the skin every 7 days.   fluticasone (FLONASE)  50 MCG/ACT nasal spray 5/6/2021 at pm Patient No No   Sig: Spray 2 Sprays in nose every bedtime.   ibuprofen (MOTRIN) 200 MG Tab 5/7/2021 at 1200 Patient Yes Yes   Sig: Take 800 mg by mouth every 6 hours as needed for Mild Pain or Headache.   loratadine (CLARITIN) 10 MG Tab 5/6/2021 at pm Patient Yes No   Sig: Take 10 mg by mouth every evening.   omeprazole (PRILOSEC) 20 MG delayed-release capsule 5/6/2021 at pm Patient Yes Yes   Sig: Take 20 mg by mouth every evening.   sumatriptan (IMITREX) 100 MG tablet last week at prn Patient No No   Sig: Take 1 Tab by mouth Once PRN for Migraine for up to 1 dose.   zolpidem (AMBIEN) 10 MG Tab 5/6/2021 at hs Patient Yes Yes   Sig: Take 10 mg by mouth at bedtime as needed for Sleep.      Facility-Administered Medications: None       Physical Exam  Temp:  [36.8 °C (98.3 °F)] 36.8 °C (98.3 °F)  Pulse:  [68-83] 75  Resp:  [16-20] 19  BP: (139-156)/(80-97) 147/97  SpO2:  [95 %-99 %] 98 %    Physical Exam  Vitals and nursing note reviewed. Exam conducted with a chaperone present.   Constitutional:       General: She is awake.      Appearance: Normal appearance. She is well-developed, well-groomed and normal weight.   HENT:      Head: Normocephalic and atraumatic.      Jaw: There is normal jaw occlusion. No trismus.      Salivary Glands: Right salivary gland is not tender. Left salivary gland is not tender.      Right Ear: External ear normal.      Left Ear: External ear normal.      Nose: Nose normal.      Mouth/Throat:      Mouth: Mucous membranes are moist.      Pharynx: Oropharynx is clear.   Eyes:      General: Lids are normal. Vision grossly intact.      Extraocular Movements: Extraocular movements intact.      Conjunctiva/sclera: Conjunctivae normal.      Right eye: Right conjunctiva is not injected. No exudate.     Left eye: Left conjunctiva is not injected. No exudate.     Pupils: Pupils are equal, round, and reactive to light.   Neck:      Thyroid: No thyroid mass.       Vascular: No hepatojugular reflux or JVD.      Trachea: No abnormal tracheal secretions or tracheal deviation.   Cardiovascular:      Rate and Rhythm: Normal rate and regular rhythm. Occasional extrasystoles are present.     Pulses: Normal pulses.      Heart sounds: Normal heart sounds. No murmur. No friction rub.   Pulmonary:      Effort: Pulmonary effort is normal.      Breath sounds: Examination of the right-lower field reveals decreased breath sounds. Examination of the left-lower field reveals decreased breath sounds. Decreased breath sounds present. No wheezing or rhonchi.   Abdominal:      General: Abdomen is flat. Bowel sounds are normal.      Palpations: Abdomen is soft.      Tenderness: There is no abdominal tenderness. There is no right CVA tenderness or left CVA tenderness.      Hernia: No hernia is present.   Musculoskeletal:         General: Normal range of motion.      Cervical back: Full passive range of motion without pain, normal range of motion and neck supple. No rigidity. No muscular tenderness.      Right lower leg: No edema.      Left lower leg: No edema.   Lymphadenopathy:      Head:      Right side of head: No submental adenopathy.      Left side of head: No submental adenopathy.      Cervical:      Right cervical: No superficial cervical adenopathy.     Left cervical: No superficial cervical adenopathy.      Upper Body:      Right upper body: No supraclavicular adenopathy.      Left upper body: No supraclavicular adenopathy.   Skin:     General: Skin is warm and dry.      Capillary Refill: Capillary refill takes more than 3 seconds.      Coloration: Skin is not cyanotic or pale.      Findings: No abrasion or bruising.   Neurological:      General: No focal deficit present.      Mental Status: She is alert and oriented to person, place, and time. Mental status is at baseline.      GCS: GCS eye subscore is 4. GCS verbal subscore is 5. GCS motor subscore is 6.      Cranial Nerves: No cranial  nerve deficit.      Sensory: No sensory deficit.      Motor: Motor function is intact.      Deep Tendon Reflexes:      Reflex Scores:       Tricep reflexes are 2+ on the right side and 2+ on the left side.       Bicep reflexes are 2+ on the right side and 2+ on the left side.       Brachioradialis reflexes are 2+ on the right side and 2+ on the left side.       Patellar reflexes are 2+ on the right side and 2+ on the left side.       Achilles reflexes are 2+ on the right side and 2+ on the left side.  Psychiatric:         Attention and Perception: Attention and perception normal.         Mood and Affect: Mood normal.         Speech: Speech normal.         Behavior: Behavior normal. Behavior is cooperative.         Thought Content: Thought content normal.         Cognition and Memory: Cognition and memory normal.         Judgment: Judgment normal.         Laboratory:  Recent Labs     05/07/21  1558   WBC 5.8   RBC 4.50   HEMOGLOBIN 12.9   HEMATOCRIT 40.7   MCV 90.4   MCH 28.7   MCHC 31.7*   RDW 46.3   PLATELETCT 218   MPV 11.8     Recent Labs     05/07/21  1558   SODIUM 140   POTASSIUM 4.9   CHLORIDE 107   CO2 25   GLUCOSE 93   BUN 19   CREATININE 1.18   CALCIUM 9.2     Recent Labs     05/07/21  1558   GLUCOSE 93         No results for input(s): NTPROBNP in the last 72 hours.      Recent Labs     05/07/21  1558   TROPONINT 13       Imaging:  CT-HEAD W/O   Final Result      1.  No CT evidence of acute infarct, hemorrhage or mass.   2.  Mild atrophy and chronic small vessel ischemic changes.      EC-ECHOCARDIOGRAM COMPLETE W/O CONT    (Results Pending)   MR-BRAIN-W/O    (Results Pending)   CT-CTA HEAD WITH & W/O-POST PROCESS    (Results Pending)   CT-CTA NECK WITH & W/O-POST PROCESSING    (Results Pending)         Assessment/Plan:  I anticipate this patient is appropriate for observation status at this time.    * TIA (transient ischemic attack)  Assessment & Plan  Patient states that around 11 this morning the wall  started melting away.  She was actually having a visual hallucination.  This was also followed by inability to speak correctly.  She was garbling her words.  The only reason she knows this is that the  was there taking care of her 2 puffs.  She says the whole reaction felt like an LSD trip.  She says she is never done LSD but she did a lot of different drugs and she thinks that is with LSD trip would feel like.  The patient on presentation at this point has no neurological deficits.  She has a mild headache.  She says that when she gets any kind of old dust which she thinks contains mold she develops allergic reaction to it and she starts having headaches as well as dizziness lightheadedness 4 hours.  She has had all sorts of allergy test done in the past and none of them have come back positive.  Due to the fact that the patient did have a TIA like event going to admit her to the telemetric unit, under telemetric monitoring we will run neuro checks on her.  She at this point can eat.  She has no problems swallowing or drinking.  The patient will need an MRI of her head.  A CT of the head and neck as well as an echocardiogram.  In the meantime she will be on aspirin and statin.  PT OT evaluation would also be preferred.  For now blood pressure is elevated and allow permissive hypertension given the fact that she may have had a stroke.    Secondary hypertension  Assessment & Plan  Currently blood pressure 181/120.  Allow permissive hypertension given the fact that she may have had a CVA.    Psychiatric problem- (present on admission)  Assessment & Plan  According to the chart the patient has had some sort of psychiatric issue in the past.  When I talked her about this she said there was some physician in 2004 who made a diagnosis on her that she had a psychiatric event when she was given morphine and could not recall how to count backwards from 100 by sevens.  She is currently not on any medications for  psychiatric problem.  I will not initiate her as she is currently not needing it.    CRF (chronic renal failure)- (present on admission)  Assessment & Plan  Per records patient has chronic renal failure.  Currently creatinine is 1.18 with a GFR 45.  Avoid nephrotoxic medications  Gentle hydration    DNR (do not resuscitate)  Assessment & Plan  Discussed CODE STATUS with patient she is very adamant about being DO NOT RESUSCITATE CODE STATUS.    Migraine- (present on admission)  Assessment & Plan  History of migraines which she feels come from old mold dust.  She does use Imitrex at home in case she does develop a severe migraine we will continue with this.    Breast CA (HCC)- (present on admission)  Assessment & Plan  History of breast cancer currently in remission.

## 2021-05-08 NOTE — PROGRESS NOTES
"At 20:00 Assessment performed, she refused the skin check.She complained about having to be admitted to the hospital \"only to have an MRI done\" she states, \"everybody is a liar, everybody has been lying\" I review the POC with her again, and told she will have her MAR until tomorrow. Charge Nurse updated and talked to Pt. Pt also complaining about having 2 IVs, I explained to her that she needs one of them for the MRI and I can take out the second one out, I gave some other options but Pt refused any of them.     04:00 Pt awaken for VS and Neuro check, Pt very upset about it, she yells, \"you have to wake me up? This is how people treat prisoners at war, I came her to have an MRI and go home\" Pt is alert and oriented x4 and states understanding of POC, but as her own words, she doesn't care about the hospital's protocols.           "

## 2021-05-08 NOTE — PROGRESS NOTES
Telemetry shift summary:  Rhythm: SR     HR Range: 60s to 70s      Measurements from strip printed at 02:44:30   AK: 0.16   QRS 0.08   QT 0.42     Ectopies: Rare PAC.

## 2021-05-08 NOTE — PROGRESS NOTES
Telemetry Shift Summary    Rhythm: SR  HR: 70  Ectopy: R-PAC    Measurements for strip printed 5/8/2021 at 0935  HR 76  0.16 / 0.08 / 0.36    Normal Values  Rhythm: SR  HR:   Measurements: 0.12-0.20 / 0.06-0.10 / 0.30-0.52

## 2021-05-08 NOTE — DISCHARGE SUMMARY
"Discharge Summary    CHIEF COMPLAINT ON ADMISSION  Chief Complaint   Patient presents with   • Dizziness     Reports that this morning at \"11 or 12 oclock I got very dizzy, and I tried to talk to someone and babbling came out\". Reports this lasted \" a couple mins\" and states that these symp[toms have since resolved.        Reason for Admission  Other      Admission Date  5/7/2021    CODE STATUS  Prior    HPI & HOSPITAL COURSE  This is a 71 y.o. female here with dizziness and expressive aphasia for a couple of minutes that resolved prior to presentation to the hospital.  After this episode she spoke to friends and looked up her symptoms online and came into the ER for evaluation.  Given that her symptoms could be related to a TIA she was admitted under observation for workup.  She had CTA head/neck and MRI brain which were completed but she demanded that she leave the hospital after the tests were done even before they were read.  CTA head/neck showed no evidence of stenosis or occlusion and MRI shows no evidence of acute stroke but she does have microvascular changes and a chronic right basal ganglia lacunar infarct.  She left the hospital AMA before the radiology reports were available.  Given that she has evidence of prior stroke, she should be on ASA 81mg daily and statin medication which I will send an prescription for and try to get in touch with the patient.    Therefore, she is discharged in good and stable condition against medcial advice.      Discharge Date  5/8/2021    FOLLOW UP ITEMS POST DISCHARGE  Establish with PCP.    DISCHARGE DIAGNOSES  Principal Problem:    TIA (transient ischemic attack) POA: Unknown  Active Problems:    CRF (chronic renal failure) POA: Yes    Psychiatric problem POA: Yes      Overview: anxiety    Secondary hypertension POA: Unknown    Breast CA (HCC) POA: Yes    Migraine POA: Yes      Overview: IMO load March 2020    DNR (do not resuscitate) POA: Unknown  Resolved Problems:    * " No resolved hospital problems. *      FOLLOW UP  No future appointments.  No follow-up provider specified.    MEDICATIONS ON DISCHARGE     Medication List      START taking these medications      Instructions   rosuvastatin 10 MG Tabs  Commonly known as: CRESTOR   Take 1 tablet by mouth every evening.  Dose: 10 mg        CHANGE how you take these medications      Instructions   * aspirin 325 MG Tabs  What changed: Another medication with the same name was added. Make sure you understand how and when to take each.  Commonly known as: ASA   Take 650 mg by mouth every 6 hours as needed for Mild Pain or Inflammation.  Dose: 650 mg     * aspirin 81 MG Chew chewable tablet  What changed: You were already taking a medication with the same name, and this prescription was added. Make sure you understand how and when to take each.  Commonly known as: ASA   Chew 1 tablet every day.  Dose: 81 mg         * This list has 2 medication(s) that are the same as other medications prescribed for you. Read the directions carefully, and ask your doctor or other care provider to review them with you.            CONTINUE taking these medications      Instructions   acetaminophen 500 MG Tabs  Commonly known as: TYLENOL   Take 500 mg by mouth every 6 hours as needed for Moderate Pain.  Dose: 500 mg     amoxicillin-clavulanate 875-125 MG Tabs  Commonly known as: AUGMENTIN   Take 1 tablet by mouth 2 times a day. 10 day course  Dose: 1 tablet     diphenhydrAMINE 25 MG Tabs  Commonly known as: BENADRYL   Take 50 mg by mouth 1 time a day as needed (allergy).  Dose: 50 mg     estradiol 0.05 MG/24HR Ptwk  Commonly known as: CLIMARA   Place 1 Patch on the skin every 7 days.  Dose: 1 Patch     fluticasone 50 MCG/ACT nasal spray  Commonly known as: Flonase   Spray 2 Sprays in nose every bedtime.  Dose: 2 Spray     ibuprofen 200 MG Tabs  Commonly known as: MOTRIN   Take 800 mg by mouth every 6 hours as needed for Mild Pain or Headache.  Dose: 800 mg      loratadine 10 MG Tabs  Commonly known as: CLARITIN   Take 10 mg by mouth every evening.  Dose: 10 mg     omeprazole 20 MG delayed-release capsule  Commonly known as: PRILOSEC   Take 20 mg by mouth every evening.  Dose: 20 mg     sumatriptan 100 MG tablet  Commonly known as: IMITREX   Take 1 Tab by mouth Once PRN for Migraine for up to 1 dose.  Dose: 100 mg     zolpidem 10 MG Tabs  Commonly known as: AMBIEN   Take 10 mg by mouth at bedtime as needed for Sleep.  Dose: 10 mg            Allergies  No Known Allergies    DIET  No orders of the defined types were placed in this encounter.      ACTIVITY  As tolerated.  Weight bearing as tolerated    CONSULTATIONS  none    PROCEDURES  none    LABORATORY  Lab Results   Component Value Date    SODIUM 139 05/08/2021    POTASSIUM 3.7 05/08/2021    CHLORIDE 109 05/08/2021    CO2 20 05/08/2021    GLUCOSE 82 05/08/2021    BUN 14 05/08/2021    CREATININE 0.93 05/08/2021        Lab Results   Component Value Date    WBC 6.1 05/08/2021    HEMOGLOBIN 11.8 (L) 05/08/2021    HEMATOCRIT 37.6 05/08/2021    PLATELETCT 188 05/08/2021        Total time of the discharge process exceeds 32 minutes.

## 2021-05-08 NOTE — ASSESSMENT & PLAN NOTE
According to the chart the patient has had some sort of psychiatric issue in the past.  When I talked her about this she said there was some physician in 2004 who made a diagnosis on her that she had a psychiatric event when she was given morphine and could not recall how to count backwards from 100 by sevens.  She is currently not on any medications for psychiatric problem.  I will not initiate her as she is currently not needing it.

## 2021-05-08 NOTE — ASSESSMENT & PLAN NOTE
Patient states that around 11 this morning the wall started melting away.  She was actually having a visual hallucination.  This was also followed by inability to speak correctly.  She was garbling her words.  The only reason she knows this is that the  was there taking care of her 2 puffs.  She says the whole reaction felt like an LSD trip.  She says she is never done LSD but she did a lot of different drugs and she thinks that is with LSD trip would feel like.  The patient on presentation at this point has no neurological deficits.  She has a mild headache.  She says that when she gets any kind of old dust which she thinks contains mold she develops allergic reaction to it and she starts having headaches as well as dizziness lightheadedness 4 hours.  She has had all sorts of allergy test done in the past and none of them have come back positive.  Due to the fact that the patient did have a TIA like event going to admit her to the telemetric unit, under telemetric monitoring we will run neuro checks on her.  She at this point can eat.  She has no problems swallowing or drinking.  The patient will need an MRI of her head.  A CT of the head and neck as well as an echocardiogram.  In the meantime she will be on aspirin and statin.  PT OT evaluation would also be preferred.  For now blood pressure is elevated and allow permissive hypertension given the fact that she may have had a stroke.

## 2021-05-08 NOTE — ASSESSMENT & PLAN NOTE
Per records patient has chronic renal failure.  Currently creatinine is 1.18 with a GFR 45.  Avoid nephrotoxic medications  Gentle hydration

## 2021-05-08 NOTE — ASSESSMENT & PLAN NOTE
Currently blood pressure 181/120.  Allow permissive hypertension given the fact that she may have had a CVA.

## 2021-05-10 LAB
EST. AVERAGE GLUCOSE BLD GHB EST-MCNC: 120 MG/DL
HBA1C MFR BLD: 5.8 % (ref 4–5.6)
SARS-COV-2 RNA RESP QL NAA+PROBE: NOTDETECTED
SPECIMEN SOURCE: NORMAL

## 2021-05-25 ENCOUNTER — APPOINTMENT (OUTPATIENT)
Dept: NEUROLOGY | Facility: MEDICAL CENTER | Age: 71
End: 2021-05-25
Attending: NURSE PRACTITIONER
Payer: MEDICARE

## 2021-06-03 ENCOUNTER — NON-PROVIDER VISIT (OUTPATIENT)
Dept: CARDIOLOGY | Facility: MEDICAL CENTER | Age: 71
End: 2021-06-03
Payer: MEDICARE

## 2021-06-03 ENCOUNTER — TELEPHONE (OUTPATIENT)
Dept: CARDIOLOGY | Facility: MEDICAL CENTER | Age: 71
End: 2021-06-03

## 2021-06-03 ENCOUNTER — OFFICE VISIT (OUTPATIENT)
Dept: CARDIOLOGY | Facility: MEDICAL CENTER | Age: 71
End: 2021-06-03
Payer: MEDICARE

## 2021-06-03 VITALS
HEART RATE: 83 BPM | HEIGHT: 63 IN | OXYGEN SATURATION: 99 % | WEIGHT: 145.5 LBS | SYSTOLIC BLOOD PRESSURE: 112 MMHG | DIASTOLIC BLOOD PRESSURE: 74 MMHG | BODY MASS INDEX: 25.78 KG/M2

## 2021-06-03 DIAGNOSIS — I49.1 PREMATURE ATRIAL CONTRACTIONS: ICD-10-CM

## 2021-06-03 DIAGNOSIS — G45.9 TIA (TRANSIENT ISCHEMIC ATTACK): ICD-10-CM

## 2021-06-03 DIAGNOSIS — R42 DIZZINESS: ICD-10-CM

## 2021-06-03 DIAGNOSIS — R00.2 PALPITATIONS: ICD-10-CM

## 2021-06-03 DIAGNOSIS — R07.89 OTHER CHEST PAIN: ICD-10-CM

## 2021-06-03 DIAGNOSIS — I63.9 CEREBROVASCULAR ACCIDENT (CVA), UNSPECIFIED MECHANISM (HCC): ICD-10-CM

## 2021-06-03 DIAGNOSIS — I49.3 PVC (PREMATURE VENTRICULAR CONTRACTION): ICD-10-CM

## 2021-06-03 LAB — EKG IMPRESSION: NORMAL

## 2021-06-03 PROCEDURE — 99204 OFFICE O/P NEW MOD 45 MIN: CPT | Performed by: INTERNAL MEDICINE

## 2021-06-03 PROCEDURE — 93000 ELECTROCARDIOGRAM COMPLETE: CPT | Performed by: INTERNAL MEDICINE

## 2021-06-03 RX ORDER — DEXTROAMPHETAMINE SULFATE 10 MG/1
TABLET ORAL
COMMUNITY
Start: 2021-05-17 | End: 2021-06-03

## 2021-06-03 RX ORDER — TRAMADOL HYDROCHLORIDE 50 MG/1
50 TABLET ORAL EVERY 6 HOURS PRN
COMMUNITY
Start: 2021-05-05

## 2021-06-03 RX ORDER — CLINDAMYCIN HYDROCHLORIDE 300 MG/1
CAPSULE ORAL
COMMUNITY
Start: 2021-05-02 | End: 2021-06-03

## 2021-06-03 RX ORDER — FLUCONAZOLE 150 MG/1
150 TABLET ORAL
COMMUNITY
Start: 2021-05-23

## 2021-06-03 RX ORDER — CIPROFLOXACIN 250 MG/1
TABLET, FILM COATED ORAL
COMMUNITY
Start: 2021-05-18 | End: 2021-06-03

## 2021-06-03 ASSESSMENT — ENCOUNTER SYMPTOMS
HEARTBURN: 1
SHORTNESS OF BREATH: 0
BRUISES/BLEEDS EASILY: 1
LIGHT-HEADEDNESS: 0
BLOATING: 0
DECREASED APPETITE: 0
CONSTIPATION: 1
NIGHT SWEATS: 0
WEAKNESS: 0
DOUBLE VISION: 0
DIAPHORESIS: 0
COUGH: 0
DYSPNEA ON EXERTION: 0
VOMITING: 0
SYNCOPE: 0
NAUSEA: 0
SORE THROAT: 0
EXCESSIVE DAYTIME SLEEPINESS: 0
NECK PAIN: 1
DEPRESSION: 1
PND: 0
BACK PAIN: 1
IRREGULAR HEARTBEAT: 0
MYALGIAS: 0
FLANK PAIN: 0
MEMORY LOSS: 1
NUMBNESS: 0
WHEEZING: 0
FALLS: 0
ORTHOPNEA: 0
PARESTHESIAS: 0
NEAR-SYNCOPE: 0
DIARRHEA: 0
BLURRED VISION: 1
SLEEP DISTURBANCES DUE TO BREATHING: 0
HEADACHES: 0
DIZZINESS: 0
LOSS OF BALANCE: 0
FEVER: 0
PALPITATIONS: 1

## 2021-06-03 NOTE — TELEPHONE ENCOUNTER
Patient enrolled in the 30 day [employer] [facility] Heart monitoring program per .  >In office hookup with [facility] transmitted  >Pending EOS.

## 2021-06-03 NOTE — PROGRESS NOTES
Cardiology Initial Consultation Note    Date of note:    6/3/2021    Primary Care Provider: Pcp Pt States None  Referring Provider: No ref. provider found     Patient Name: Aliza Jacobson     YOB: 1950  MRN:              [id number]    Chief Complaint   Patient presents with   • Possible Stroke       History of Present Illness: Ms. Aliza Jacobson is a 71 y.o. female whose current medical problems include TIA who is here for cardiac consultation for CVA.    Patient states that she was feeling 'bad' with dizziness, aphasia and garbled speech for which she went to [facility] ED on 5/7/2021.  Had an MRI which showed no acute stroke but was found to have microvascular changes and a chronic right basal ganglia lacunar infarct.  She was discharged on 5/8/2021 and was referred to cardiology.    Reports palpitations, which has been going on since menopause at the age of 39.  She is currently on estrogen replacement therapy which helps with the palpitations.  Previously, when estrogen was discontinued she had recurrence of palpitations.    In terms of physical activity, she is mostly sedentary, lives alone with her 2 dogs.     Cardiovascular Risk Factors:  1. Smoking status: Former smoker, quit 1974  2. Type II Diabetes Mellitus:  No  Lab Results   Component Value Date/Time    HBA1C 5.8 (H) 05/07/2021 03:58 PM     3. Hypertension: No  4. Dyslipidemia: No   Cholesterol,Tot   Date Value Ref Range Status   05/08/2021 157 100 - 199 mg/dL Final     LDL   Date Value Ref Range Status   05/08/2021 78 <100 mg/dL Final     HDL   Date Value Ref Range Status   05/08/2021 42 >=40 mg/dL Final     Triglycerides   Date Value Ref Range Status   05/08/2021 184 (H) 0 - 149 mg/dL Final     5. Family history of early Coronary Artery Disease in a first degree relative (Male less than 55 years of age; Female less than 65 years of age): Denies  6.  Obesity and/or Metabolic Syndrome: BMI 25  7. Sedentary lifestyle: Yes    Review of  Systems   Constitutional: Negative for decreased appetite, diaphoresis, fever, malaise/fatigue and night sweats.   HENT: Negative for congestion and sore throat.    Eyes: Positive for blurred vision. Negative for double vision.   Cardiovascular: Positive for palpitations. Negative for chest pain, cyanosis, dyspnea on exertion, irregular heartbeat, leg swelling, near-syncope, orthopnea, paroxysmal nocturnal dyspnea and syncope.   Respiratory: Negative for cough, shortness of breath, sleep disturbances due to breathing and wheezing.    Endocrine: Negative for cold intolerance and heat intolerance.   Hematologic/Lymphatic: Bruises/bleeds easily.   Musculoskeletal: Positive for back pain and neck pain. Negative for falls and myalgias.   Gastrointestinal: Positive for constipation and heartburn. Negative for bloating, diarrhea, nausea and vomiting.   Genitourinary: Positive for hematuria and urgency. Negative for dysuria and flank pain.   Neurological: Negative for excessive daytime sleepiness, dizziness, headaches, light-headedness, loss of balance, numbness, paresthesias and weakness.   Psychiatric/Behavioral: Positive for depression and memory loss.   Allergic/Immunologic: Positive for environmental allergies.       Past Medical History:   Diagnosis Date   • Backpain    • Breast CA (HCC)    • Bronchitis    • Cancer (HCC)     breast ca   • Migraine    • Other specified symptom associated with female genital organs     yeast infections   • Pneumonia    • Psychiatric problem     anxiety   • Renal disorder     acute renal failure   • Secondary hypertension 5/7/2021         Past Surgical History:   Procedure Laterality Date   • LUMPECTOMY  1987   • OTHER      face lift         Current Outpatient Medications   Medication Sig Dispense Refill   • fluconazole (DIFLUCAN) 150 MG tablet Take 150 mg by mouth.     • traMADol (ULTRAM) 50 MG Tab Take 50 mg by mouth every 6 hours as needed.     • rosuvastatin (CRESTOR) 10 MG Tab Take  1 tablet by mouth every evening. 30 tablet 1   • aspirin (ASA) 81 MG Chew Tab chewable tablet Chew 1 tablet every day. 100 tablet    • loratadine (CLARITIN) 10 MG Tab Take 10 mg by mouth every evening.     • zolpidem (AMBIEN) 10 MG Tab Take 10 mg by mouth at bedtime as needed for Sleep.     • omeprazole (PRILOSEC) 20 MG delayed-release capsule Take 20 mg by mouth every evening.     • ibuprofen (MOTRIN) 200 MG Tab Take 800 mg by mouth every 6 hours as needed for Mild Pain or Headache.     • acetaminophen (TYLENOL) 500 MG Tab Take 500 mg by mouth every 6 hours as needed for Moderate Pain.     • diphenhydrAMINE (BENADRYL) 25 MG Tab Take 50 mg by mouth 1 time a day as needed (allergy).     • sumatriptan (IMITREX) 100 MG tablet Take 1 Tab by mouth Once PRN for Migraine for up to 1 dose. 10 Tab 3   • fluticasone (FLONASE) 50 MCG/ACT nasal spray Spray 2 Sprays in nose every bedtime. 1 Bottle 1   • estradiol (CLIMARA) 0.05 MG/24HR PATCH WEEKLY Place 1 Patch on the skin every 7 days.  0     No current facility-administered medications for this visit.         No Known Allergies      Family History   Problem Relation Age of Onset   • Cancer Mother    • Cancer Father    • Heart Disease Father          Social History     Socioeconomic History   • Marital status: Single     Spouse name: Not on file   • Number of children: Not on file   • Years of education: Not on file   • Highest education level: Not on file   Occupational History   • Not on file   Tobacco Use   • Smoking status: Former Smoker     Packs/day: 1.00     Years: 1.00     Pack years: 1.00     Quit date: 1974     Years since quittin.9   • Smokeless tobacco: Never Used   Vaping Use   • Vaping Use: Unknown   Substance and Sexual Activity   • Alcohol use: Yes     Comment: rarely   • Drug use: No   • Sexual activity: Never     Partners: Male   Other Topics Concern   • Not on file   Social History Narrative   • Not on file     Social Determinants of Health      "    Financial Resource Strain:    • Difficulty of Paying Living Expenses:    Food Insecurity:    • Worried About Running Out of Food in the Last Year:    • Ran Out of Food in the Last Year:    Transportation Needs:    • Lack of Transportation (Medical):    • Lack of Transportation (Non-Medical):    Physical Activity:    • Days of Exercise per Week:    • Minutes of Exercise per Session:    Stress:    • Feeling of Stress :    Social Connections:    • Frequency of Communication with Friends and Family:    • Frequency of Social Gatherings with Friends and Family:    • Attends [employer] Services:    • Active Member of Clubs or Organizations:    • Attends Club or Organization Meetings:    • Marital Status:    Intimate Partner Violence:    • Fear of Current or Ex-Partner:    • Emotionally Abused:    • Physically Abused:    • Sexually Abused:          Physical Exam:  Ambulatory Vitals  /74 (BP Location: Left arm, Patient Position: Sitting, BP Cuff Size: Adult)   Pulse 83   Ht 1.6 m (5' 3\")   Wt 66 kg (145 lb 8 oz)   SpO2 99%    Oxygen Therapy:  Pulse Oximetry: 99 %  BP Readings from Last 4 Encounters:   06/03/21 112/74   05/08/21 145/80   10/21/19 112/74   07/03/19 138/88       Weight/BMI: Body mass index is 25.77 kg/m².  Wt Readings from Last 4 Encounters:   06/03/21 66 kg (145 lb 8 oz)   05/07/21 68.1 kg (150 lb 2.1 oz)   05/07/21 66.5 kg (146 lb 9.7 oz)   10/21/19 67.6 kg (149 lb)       General: Well appearing and in no apparent distress  Eyes: nl conjunctiva, no icteric sclera  ENT: wearing a mask, normal external appearance of ears  Neck: no visible JVP,  no carotid bruits  Lungs: normal respiratory effort, CTAB  Heart: RRR, no murmurs, no rubs or gallops, no edema bilateral lower extremities. No LV/RV heave on cardiac palpatation. 2+ bilateral radial pulses.  2+ bilateral dp pulses.   Abdomen: soft, non tender, non distended, no masses, normal bowel sounds.  No HSM.  Extremities/MSK: no clubbing, no " cyanosis  Neurological: No focal sensory deficits  Psychiatric: Appropriate affect, A/O x 3, intact judgement and insight  Skin: Warm extremities      Lab Data Review:  Lab Results   Component Value Date/Time    CHOLSTRLTOT 157 05/08/2021 03:33 AM    LDL 78 05/08/2021 03:33 AM    HDL 42 05/08/2021 03:33 AM    TRIGLYCERIDE 184 (H) 05/08/2021 03:33 AM       Lab Results   Component Value Date/Time    SODIUM 139 05/08/2021 03:33 AM    POTASSIUM 3.7 05/08/2021 03:33 AM    CHLORIDE 109 05/08/2021 03:33 AM    CO2 20 05/08/2021 03:33 AM    GLUCOSE 82 05/08/2021 03:33 AM    BUN 14 05/08/2021 03:33 AM    CREATININE 0.93 05/08/2021 03:33 AM     Lab Results   Component Value Date/Time    ALKPHOSPHAT 75 11/05/2012 10:19 AM    ASTSGOT 15 11/05/2012 10:19 AM    ALTSGPT 14 11/05/2012 10:19 AM    TBILIRUBIN 0.4 11/05/2012 10:19 AM      Lab Results   Component Value Date/Time    WBC 6.1 05/08/2021 03:33 AM     Lab Results   Component Value Date/Time    HBA1C 5.8 (H) 05/07/2021 03:58 PM         Cardiac Imaging and Procedures Review:    EKG dated 6/3/2021: My personal interpretation is normal sinus rhythm with heart rate 81 bpm    Echo dated 5/8/2021:   My personal interpretation is normal left ventricular size, wall thickness and systolic function with EF 75%, normal left atrial size.      Radiology test Review:  CTA neck:   FINDINGS:  The arch origins of the great vessels appear intact. The aortic arch shows no abnormality.  The right common carotid artery, cervical carotid bifurcation, and cervical internal carotid artery are within normal limits. There is no evidence of significant stenosis, thrombus, or other filling defect or ulceration. There is no evidence of dissection or aneurysm.  The left common carotid artery, cervical carotid bifurcation, and cervical internal carotid artery are within normal limits. There is no evidence of significant stenosis, thrombus, or other filling defect or ulceration. There is no evidence of  dissection or aneurysm.  The cervical vertebral arteries are normal bilaterally.  The neck soft tissues and lung apices in the field of view are unremarkable.     MRI Brain without contrast (5/7/2021):   IMPRESSION:     1.  No evidence of acute territorial infarct, intracranial hemorrhage or mass lesion.  2.  Mild diffuse cerebral substance loss.  3.  Mild microangiopathic ischemic change versus demyelination or gliosis.  4.  Chronic right basal ganglia lacunar infarct.      Assessment & Plan   1. TIA (transient ischemic attack)  Cardiac Event Monitor    CANCELED: Cardiac Event Monitor    CANCELED: Cardiac Event Monitor   2. Cerebrovascular accident (CVA), unspecified mechanism (HCC)  Cardiac Event Monitor   3. Palpitations  Cardiac Event Monitor    CANCELED: Cardiac Event Monitor    CANCELED: Cardiac Event Monitor   4. Dizziness  Cardiac Event Monitor    CANCELED: Cardiac Event Monitor    CANCELED: Cardiac Event Monitor   5. Other chest pain  EKG    Cardiac Event Monitor    CANCELED: Cardiac Event Monitor    CANCELED: Cardiac Event Monitor       Shared Medical Decision Making:  Obtain 30 day cardiac event monitor to rule out any underlying arrhythmia associated with symptoms of palpitations and dizziness with MRI findings of prior stroke.  Encouraged patient to trigger the device and write down her symptoms to best correlate them with underlying rhythm.  Discussed that if there is evidence of A. fib she will be started on oral anticoagulation to which she voices understanding.    Continue aspirin 81 mg and Crestor 10 mg daily.  Repeat lipid panel in 3 months.    All of patient's excellent questions were answered to the best of my knowledge and to her satisfaction.  It was a pleasure seeing Ms. Aliza Jacobson in my clinic today.  Patient is aware to call the cardiology clinic with any questions or concerns.      Steven Ingram MD  [facility]  [facility], [facility]  [address]  [address] [place] [address]  [place] [address]  Phone: [phone]  Fax: [phone]

## 2021-07-07 PROCEDURE — 93228 REMOTE 30 DAY ECG REV/REPORT: CPT | Performed by: INTERNAL MEDICINE

## 2021-07-12 ENCOUNTER — TELEPHONE (OUTPATIENT)
Dept: CARDIOLOGY | Facility: MEDICAL CENTER | Age: 71
End: 2021-07-12

## 2021-07-12 NOTE — TELEPHONE ENCOUNTER
You  Steven Ingram M.D. 5 hours ago (9:06 AM)     I think [employer] has hypoallergenic patches available. Did you want me to reorder and have her retry?   Thanks!      Steven Ingram M.D.  You 41 minutes ago (2:19 PM)     Sure.  If she wants to do it again then we can do the hypoallergenic patches but if she thinks her symptoms are not frequent and will like to wait that is ok too.   Thanks!      LVM with pt at [phone] notifying that we can place another order and have her repeat the test, or if she doesn't feel symptoms very frequently she can hold off for now. Requested she let us know if she would like to repeat.

## 2021-07-12 NOTE — TELEPHONE ENCOUNTER
Cardiac Event Monitor  Steven Ingram M.D.  Darcie Graves R.N.  She was supposed to do a 30 day [employer] but only did for few hours.  Not sure why.  No other cardiac testing or fu needed at this time.   DA         Called pt. She felt like she was getting a rash with the monitor and took it off early. She doesn't feel that she is having any palpitations, but she said she would be willing to try it again if she could get some hypoallergenic adhesive.

## 2023-06-20 ENCOUNTER — OFFICE VISIT (OUTPATIENT)
Dept: URGENT CARE | Facility: CLINIC | Age: 73
End: 2023-06-20
Payer: MEDICARE

## 2023-06-20 VITALS
BODY MASS INDEX: 26.68 KG/M2 | TEMPERATURE: 97 F | OXYGEN SATURATION: 95 % | HEART RATE: 77 BPM | HEIGHT: 62 IN | DIASTOLIC BLOOD PRESSURE: 80 MMHG | WEIGHT: 145 LBS | SYSTOLIC BLOOD PRESSURE: 142 MMHG | RESPIRATION RATE: 16 BRPM

## 2023-06-20 DIAGNOSIS — M62.830 MUSCLE SPASM OF BACK: ICD-10-CM

## 2023-06-20 DIAGNOSIS — H69.92 EUSTACHIAN TUBE DYSFUNCTION, LEFT: ICD-10-CM

## 2023-06-20 DIAGNOSIS — I10 ELEVATED BLOOD PRESSURE READING IN OFFICE WITH DIAGNOSIS OF HYPERTENSION: ICD-10-CM

## 2023-06-20 PROCEDURE — 3079F DIAST BP 80-89 MM HG: CPT | Performed by: NURSE PRACTITIONER

## 2023-06-20 PROCEDURE — 99203 OFFICE O/P NEW LOW 30 MIN: CPT | Performed by: NURSE PRACTITIONER

## 2023-06-20 PROCEDURE — 3077F SYST BP >= 140 MM HG: CPT | Performed by: NURSE PRACTITIONER

## 2023-06-20 RX ORDER — OMEPRAZOLE 40 MG/1
40 CAPSULE, DELAYED RELEASE ORAL
COMMUNITY
Start: 2023-04-18

## 2023-06-20 RX ORDER — TRAZODONE HYDROCHLORIDE 100 MG/1
TABLET ORAL
COMMUNITY
Start: 2023-05-26

## 2023-06-20 RX ORDER — CYCLOBENZAPRINE HCL 5 MG
5 TABLET ORAL 3 TIMES DAILY PRN
Qty: 21 TABLET | Refills: 0 | Status: SHIPPED | OUTPATIENT
Start: 2023-06-20 | End: 2023-06-27

## 2023-06-20 RX ORDER — AZITHROMYCIN 250 MG/1
TABLET, FILM COATED ORAL
COMMUNITY
Start: 2023-06-16

## 2023-06-21 NOTE — PROGRESS NOTES
Patient has consented to treatment and for use of patient information for treatment and billing purposes.    Date: 06/20/23     Arrival Mode: Private Vehicle    Chief Complaint:    Chief Complaint   Patient presents with    Neck Pain     Patient states neck pain x 2 days, she believes it to be related to her ear infection she has had on and off for months        History of Present Illness: 73 y.o.  female presents to clinic with 2-day history of neck pain and tenderness.  She states she has been struggling on and off with several months for an ear infection on the left side.  She has not been seen or evaluated for this until recently.  She recently started ofloxacin eardrops.  She states there has been improvement.  She has concerns that her neck pain is related to her ear infection.  No fevers or body aches.  No nausea vomiting diarrhea.  No shortness of breath chest pain or leg swelling.      ROS:    As stated in HPI     Pertinent Medical History:  Past Medical History:   Diagnosis Date    Backpain     Breast CA (HCC)     Bronchitis     Cancer (HCC)     breast ca    Migraine     Other specified symptom associated with female genital organs     yeast infections    Pneumonia     Psychiatric problem     anxiety    Renal disorder     acute renal failure    Secondary hypertension 5/7/2021        Pertinent Surgical History:  Past Surgical History:   Procedure Laterality Date    LUMPECTOMY  1987    OTHER      face lift        Pertinent Medications:    Current Outpatient Medications on File Prior to Visit   Medication Sig Dispense Refill    omeprazole (PRILOSEC) 40 MG delayed-release capsule Take 40 mg by mouth every day.      traZODone (DESYREL) 100 MG Tab TAKE 1 TABLET BY MOUTH EVERY NIGHT AT BEDTIME. TAKE HALF TO FULL TABLET      azithromycin (ZITHROMAX) 250 MG Tab TAKE 2 TABLETS BY MOUTH TODAY, THEN TAKE 1 TABLET DAILY FOR 4 DAYS      fluconazole (DIFLUCAN) 150 MG tablet Take 150 mg by mouth.      traMADol (ULTRAM) 50  MG Tab Take 50 mg by mouth every 6 hours as needed.      rosuvastatin (CRESTOR) 10 MG Tab Take 1 tablet by mouth every evening. 30 tablet 1    aspirin (ASA) 81 MG Chew Tab chewable tablet Chew 1 tablet every day. 100 tablet     loratadine (CLARITIN) 10 MG Tab Take 10 mg by mouth every evening.      zolpidem (AMBIEN) 10 MG Tab Take 10 mg by mouth at bedtime as needed for Sleep.      omeprazole (PRILOSEC) 20 MG delayed-release capsule Take 20 mg by mouth every evening.      ibuprofen (MOTRIN) 200 MG Tab Take 800 mg by mouth every 6 hours as needed for Mild Pain or Headache.      acetaminophen (TYLENOL) 500 MG Tab Take 500 mg by mouth every 6 hours as needed for Moderate Pain.      sumatriptan (IMITREX) 100 MG tablet Take 1 Tab by mouth Once PRN for Migraine for up to 1 dose. 10 Tab 3    fluticasone (FLONASE) 50 MCG/ACT nasal spray Spray 2 Sprays in nose every bedtime. 1 Bottle 1    estradiol (CLIMARA) 0.05 MG/24HR PATCH WEEKLY Place 1 Patch on the skin every 7 days.  0     No current facility-administered medications on file prior to visit.        Allergies:    Patient has no known allergies.     Social History:  Social History     Tobacco Use    Smoking status: Former     Packs/day: 1.00     Years: 1.00     Pack years: 1.00     Types: Cigarettes     Quit date: 1974     Years since quittin.9    Smokeless tobacco: Never   Vaping Use    Vaping Use: Unknown   Substance Use Topics    Alcohol use: Yes     Comment: rarely    Drug use: No        No LMP recorded. Patient is postmenopausal.           Physical Exam:    Vitals:    23 1803   BP: (!) 142/80   Pulse: 77   Resp: 16   Temp: 36.1 °C (97 °F)   SpO2: 95%             Physical Exam  Constitutional:       General: She is not in acute distress.     Appearance: Normal appearance. She is well-developed and normal weight. She is not ill-appearing, toxic-appearing or diaphoretic.   HENT:      Head: Normocephalic and atraumatic.      Right Ear: A middle ear  effusion is present. Tympanic membrane is not erythematous or bulging.      Left Ear: A middle ear effusion is present. Tympanic membrane is not erythematous or bulging.   Eyes:      General: Lids are normal. Gaze aligned appropriately. No allergic shiner or scleral icterus.     Extraocular Movements: Extraocular movements intact.      Conjunctiva/sclera: Conjunctivae normal.   Cardiovascular:      Rate and Rhythm: Normal rate and regular rhythm.      Pulses:           Radial pulses are 2+ on the right side and 2+ on the left side.      Heart sounds: Normal heart sounds.   Pulmonary:      Effort: Pulmonary effort is normal.      Breath sounds: Normal breath sounds and air entry. No decreased breath sounds, wheezing, rhonchi or rales.   Abdominal:      General: Abdomen is flat. Bowel sounds are normal.      Palpations: Abdomen is soft.      Tenderness: There is no abdominal tenderness.   Musculoskeletal:      Cervical back: Spasms and tenderness present. No bony tenderness. Muscular tenderness present. No spinous process tenderness.      Thoracic back: Normal.      Lumbar back: Normal.        Back:       Right lower leg: No edema.      Left lower leg: No edema.   Skin:     General: Skin is warm.      Capillary Refill: Capillary refill takes less than 2 seconds.      Coloration: Skin is not cyanotic or pale.   Neurological:      Mental Status: She is alert and oriented to person, place, and time.      Gait: Gait is intact.   Psychiatric:         Behavior: Behavior normal. Behavior is cooperative.         Diagnostics:    None         Medical Decision making and clinic course :  I personally reviewed prior external notes and test results pertinent to today's visit. Pt is clinically stable at today's acute urgent care visit.  No acute distress noted. Appropriate for outpatient care at this time. Shared decision-making was utilized with patient for treatment plan.    Pleasant nontoxic-appearing 73-year-old female with  continued left ear pain.  Patient is currently being treated for otitis media.  On exam patient has no signs or symptoms of bacterial infection discussed that exam findings are consistent with eustachian tube dysfunction.  Advised patient start using Flonase that she has uses prior and does have some at home.     Patient's left cervical neck pain is consistent with muscular spasm as exam findings are reproducible on exam spasm is palpated.  No midline bony tenderness crepitus or step-off noted throughout.  Discussed treatment.  Will use topical Voltaren did send low-dose Flexeril.  Patient is agreeable to not use this with trazodone or Ambien. Advised the patient to only take this medication at night, as it may cause drowsiness. Instructed the patient not to take the medication while at work, driving, or operating machinery.  Instructed the patient not to drink alcohol while taking this medication.       The patient remained stable during the urgent care visit.    Plan:    Medication discussed included indication for use and the potential  benefits and side effects.       1. Eustachian tube dysfunction, left      2. Muscle spasm of back    - diclofenac sodium (VOLTAREN) 1 % Gel; Apply 2 g topically 2 times a day as needed (pain) for up to 14 days.  Dispense: 100 g; Refill: 0  - cyclobenzaprine (FLEXERIL) 5 mg tablet; Take 1 Tablet by mouth 3 times a day as needed for Muscle Spasms for up to 7 days.  Dispense: 21 Tablet; Refill: 0         All of the patient's questions were answered to their satisfaction at the time of discharge.    Follow up:        Patient was encouraged to monitor symptoms closely. Those signs and symptoms which would warrant concern and mandate seeking a higher level of service through the emergency department discussed at length and included in discharge papers.  Patient stated agreement and understanding of this plan of care.    Disposition:  Home in stable condition       Voice Recognition  Disclaimer:  Portions of this document were created using voice recognition software. The software does have a chance of producing errors of grammar and possibly content. I have made every reasonable attempt to correct obvious errors, but there may be errors of grammar and possibly content that I did not discover before finalizing the documentation.    HARLEEN Mariano.

## 2023-06-23 ENCOUNTER — TELEPHONE (OUTPATIENT)
Dept: HEALTH INFORMATION MANAGEMENT | Facility: OTHER | Age: 73
End: 2023-06-23
Payer: MEDICARE

## 2023-09-26 ENCOUNTER — OFFICE VISIT (OUTPATIENT)
Dept: URGENT CARE | Facility: CLINIC | Age: 73
End: 2023-09-26
Payer: MEDICARE

## 2023-09-26 VITALS
OXYGEN SATURATION: 97 % | WEIGHT: 145 LBS | RESPIRATION RATE: 18 BRPM | TEMPERATURE: 97.6 F | DIASTOLIC BLOOD PRESSURE: 56 MMHG | SYSTOLIC BLOOD PRESSURE: 110 MMHG | HEART RATE: 64 BPM | BODY MASS INDEX: 26.68 KG/M2 | HEIGHT: 62 IN

## 2023-09-26 DIAGNOSIS — R42 VERTIGO: ICD-10-CM

## 2023-09-26 DIAGNOSIS — H69.92 EUSTACHIAN TUBE DYSFUNCTION, LEFT: ICD-10-CM

## 2023-09-26 PROCEDURE — 3074F SYST BP LT 130 MM HG: CPT | Performed by: PHYSICIAN ASSISTANT

## 2023-09-26 PROCEDURE — 3078F DIAST BP <80 MM HG: CPT | Performed by: PHYSICIAN ASSISTANT

## 2023-09-26 PROCEDURE — 99214 OFFICE O/P EST MOD 30 MIN: CPT | Performed by: PHYSICIAN ASSISTANT

## 2023-09-26 RX ORDER — MECLIZINE HYDROCHLORIDE 25 MG/1
25 TABLET ORAL 3 TIMES DAILY PRN
Qty: 21 TABLET | Refills: 0 | Status: SHIPPED | OUTPATIENT
Start: 2023-09-26

## 2023-09-26 RX ORDER — DEXTROAMPHETAMINE SULFATE 10 MG/1
20 TABLET ORAL 2 TIMES DAILY
COMMUNITY
Start: 2023-09-16

## 2023-09-26 ASSESSMENT — ENCOUNTER SYMPTOMS
COUGH: 0
SHORTNESS OF BREATH: 0
FEVER: 0
SORE THROAT: 0
EYE PAIN: 0
EYE REDNESS: 0
DIZZINESS: 1
BLURRED VISION: 0
MYALGIAS: 0
PHOTOPHOBIA: 0
DOUBLE VISION: 0
CHILLS: 0
EYE DISCHARGE: 0

## 2023-09-26 NOTE — PROGRESS NOTES
Subjective:   Aliza Jacobson is a 73 y.o. female who presents today with   Chief Complaint   Patient presents with    Dizziness     Lt ear, Ear ache, tired x today, head feels that it's spinning        Dizziness  This is a new problem. The current episode started today. The problem occurs constantly. The problem has been unchanged. Pertinent negatives include no chest pain, chills, coughing, fever, myalgias or sore throat.     Symptoms worsen with positional change.  Patient also noting fullness to the left ear and states she has had intermittent issues with the left ear in the past.    PMH:  has a past medical history of Backpain, Breast CA (HCC), Bronchitis, Cancer (HCC), Migraine, Other specified symptom associated with female genital organs, Pneumonia, Psychiatric problem, Renal disorder, and Secondary hypertension (5/7/2021).    She has no past medical history of Arrhythmia, Arthritis, ASTHMA, CATARACT, Congestive heart failure (HCC), Diabetes, Glaucoma, Heart murmur, Heart valve disease, Jaundice, Myocardial infarct (HCC), Pacemaker, Personal history of venous thrombosis and embolism, Stroke (HCC), Unspecified disorder of thyroid, or Unspecified hemorrhagic conditions.  MEDS:   Current Outpatient Medications:     dextroamphetamine (DEXTROSTAT) 10 MG tablet, Take 20 mg by mouth 2 times a day., Disp: , Rfl:     meclizine (ANTIVERT) 25 MG Tab, Take 1 Tablet by mouth 3 times a day as needed for Vertigo or Dizziness., Disp: 21 Tablet, Rfl: 0    omeprazole (PRILOSEC) 40 MG delayed-release capsule, Take 40 mg by mouth every day., Disp: , Rfl:     traZODone (DESYREL) 100 MG Tab, TAKE 1 TABLET BY MOUTH EVERY NIGHT AT BEDTIME. TAKE HALF TO FULL TABLET, Disp: , Rfl:     rosuvastatin (CRESTOR) 10 MG Tab, Take 1 tablet by mouth every evening., Disp: 30 tablet, Rfl: 1    aspirin (ASA) 81 MG Chew Tab chewable tablet, Chew 1 tablet every day., Disp: 100 tablet, Rfl:     sumatriptan (IMITREX) 100 MG tablet, Take 1 Tab by  mouth Once PRN for Migraine for up to 1 dose., Disp: 10 Tab, Rfl: 3    fluticasone (FLONASE) 50 MCG/ACT nasal spray, Spray 2 Sprays in nose every bedtime., Disp: 1 Bottle, Rfl: 1    estradiol (CLIMARA) 0.05 MG/24HR PATCH WEEKLY, Place 1 Patch on the skin every 7 days., Disp: , Rfl: 0    azithromycin (ZITHROMAX) 250 MG Tab, TAKE 2 TABLETS BY MOUTH TODAY, THEN TAKE 1 TABLET DAILY FOR 4 DAYS, Disp: , Rfl:     fluconazole (DIFLUCAN) 150 MG tablet, Take 150 mg by mouth., Disp: , Rfl:     traMADol (ULTRAM) 50 MG Tab, Take 50 mg by mouth every 6 hours as needed., Disp: , Rfl:     loratadine (CLARITIN) 10 MG Tab, Take 10 mg by mouth every evening., Disp: , Rfl:     zolpidem (AMBIEN) 10 MG Tab, Take 10 mg by mouth at bedtime as needed for Sleep. (Patient not taking: Reported on 9/26/2023), Disp: , Rfl:     ibuprofen (MOTRIN) 200 MG Tab, Take 800 mg by mouth every 6 hours as needed for Mild Pain or Headache., Disp: , Rfl:     acetaminophen (TYLENOL) 500 MG Tab, Take 500 mg by mouth every 6 hours as needed for Moderate Pain., Disp: , Rfl:   ALLERGIES: No Known Allergies  SURGHX:   Past Surgical History:   Procedure Laterality Date    LUMPECTOMY  1987    OTHER      face lift     SOCHX:  reports that she quit smoking about 49 years ago. Her smoking use included cigarettes. She started smoking about 50 years ago. She has a 1.0 pack-year smoking history. She has never used smokeless tobacco. She reports current alcohol use. She reports that she does not use drugs.  FH: Reviewed with patient, not pertinent to this visit.     Review of Systems   Constitutional:  Positive for malaise/fatigue. Negative for chills and fever.   HENT:  Positive for ear pain. Negative for sore throat.    Eyes:  Negative for blurred vision, double vision, photophobia, pain, discharge and redness.   Respiratory:  Negative for cough and shortness of breath.    Cardiovascular:  Negative for chest pain.   Musculoskeletal:  Negative for myalgias.  "  Neurological:  Positive for dizziness.      Objective:   /56 (BP Location: Left arm, Patient Position: Sitting, BP Cuff Size: Adult)   Pulse 64   Temp 36.4 °C (97.6 °F) (Temporal)   Resp 18   Ht 1.575 m (5' 2\")   Wt 65.8 kg (145 lb)   SpO2 97%   BMI 26.52 kg/m²   Physical Exam  Vitals and nursing note reviewed.   Constitutional:       General: She is not in acute distress.     Appearance: Normal appearance. She is well-developed. She is not ill-appearing or toxic-appearing.   HENT:      Head: Normocephalic and atraumatic.      Comments: With positional change of the head specifically to the left side patient notices worsening of dizziness and feeling as if the room is spinning.     Right Ear: Hearing normal.      Left Ear: Hearing normal.   Eyes:      Extraocular Movements: Extraocular movements intact.      Pupils: Pupils are equal, round, and reactive to light.   Cardiovascular:      Rate and Rhythm: Normal rate and regular rhythm.      Heart sounds: Normal heart sounds.   Pulmonary:      Effort: Pulmonary effort is normal. No respiratory distress.      Breath sounds: Normal breath sounds. No stridor. No wheezing, rhonchi or rales.   Musculoskeletal:      Comments: Normal movement in all 4 extremities   Skin:     General: Skin is warm and dry.   Neurological:      General: No focal deficit present.      Mental Status: She is alert and oriented to person, place, and time.      GCS: GCS eye subscore is 4. GCS verbal subscore is 5. GCS motor subscore is 6.      Cranial Nerves: No cranial nerve deficit, dysarthria or facial asymmetry.      Motor: Motor function is intact.      Coordination: Coordination normal.   Psychiatric:         Mood and Affect: Mood normal.       No nystagmus noted.  Symptoms worsen with Angelica-Hallpike maneuver.    Assessment/Plan:   Assessment    1. Vertigo  - Referral to ENT  - meclizine (ANTIVERT) 25 MG Tab; Take 1 Tablet by mouth 3 times a day as needed for Vertigo or Dizziness. "  Dispense: 21 Tablet; Refill: 0    2. Eustachian tube dysfunction, left  - Referral to ENT    Other orders  - dextroamphetamine (DEXTROSTAT) 10 MG tablet; Take 20 mg by mouth 2 times a day.    Symptoms and presentation appear consistent with eustachian tube dysfunction of the left ear likely attributing to her vertigo.  She is not having any other acute neurodeficits or red flag signs on exam today.  Would suggest trialing nasal spray as well as meclizine as prescribed today to try and alleviate symptoms.  Given that she has had intermittent issues with her left ear would recommend following up with ENT and referral placed today.    Patient is understand possible side effects of medication and to only take it sparingly as prescribed.  She is fully understanding to not take any sleep aids such as trazodone or Ambien while on the meclizine.  Patient fully understand possible side effects.  Discussed Epley maneuver and modified Epley maneuver with patient.    Did discuss with patient to have low threshold to go to the ER with no improvement or worsening of symptoms.  Differential diagnosis, natural history, supportive care, and indications for immediate follow-up discussed.   Patient given instructions and understanding of medications and treatment.    If not improving in 3-5 days, F/U with PCP or return to  if symptoms worsen.    Patient agreeable to plan.      Please note that this dictation was created using voice recognition software. I have made every reasonable attempt to correct obvious errors, but I expect that there are errors of grammar and possibly content that I did not discover before finalizing the note.    Alex Castro PA-C

## 2023-09-27 ENCOUNTER — HOSPITAL ENCOUNTER (EMERGENCY)
Facility: MEDICAL CENTER | Age: 73
End: 2023-09-27
Attending: STUDENT IN AN ORGANIZED HEALTH CARE EDUCATION/TRAINING PROGRAM
Payer: MEDICARE

## 2023-09-27 ENCOUNTER — APPOINTMENT (OUTPATIENT)
Dept: RADIOLOGY | Facility: MEDICAL CENTER | Age: 73
End: 2023-09-27
Attending: STUDENT IN AN ORGANIZED HEALTH CARE EDUCATION/TRAINING PROGRAM
Payer: MEDICARE

## 2023-09-27 VITALS
DIASTOLIC BLOOD PRESSURE: 85 MMHG | OXYGEN SATURATION: 93 % | SYSTOLIC BLOOD PRESSURE: 141 MMHG | RESPIRATION RATE: 18 BRPM | BODY MASS INDEX: 25.61 KG/M2 | TEMPERATURE: 98.2 F | WEIGHT: 140 LBS | HEART RATE: 78 BPM

## 2023-09-27 DIAGNOSIS — R42 VERTIGO: ICD-10-CM

## 2023-09-27 DIAGNOSIS — R51.9 NONINTRACTABLE HEADACHE, UNSPECIFIED CHRONICITY PATTERN, UNSPECIFIED HEADACHE TYPE: ICD-10-CM

## 2023-09-27 LAB
ALBUMIN SERPL BCP-MCNC: 3.9 G/DL (ref 3.2–4.9)
ALBUMIN/GLOB SERPL: 1.6 G/DL
ALP SERPL-CCNC: 95 U/L (ref 30–99)
ALT SERPL-CCNC: 18 U/L (ref 2–50)
ANION GAP SERPL CALC-SCNC: 8 MMOL/L (ref 7–16)
AST SERPL-CCNC: 15 U/L (ref 12–45)
BASOPHILS # BLD AUTO: 0.4 % (ref 0–1.8)
BASOPHILS # BLD: 0.03 K/UL (ref 0–0.12)
BILIRUB SERPL-MCNC: 0.2 MG/DL (ref 0.1–1.5)
BUN SERPL-MCNC: 14 MG/DL (ref 8–22)
CALCIUM ALBUM COR SERPL-MCNC: 9 MG/DL (ref 8.5–10.5)
CALCIUM SERPL-MCNC: 8.9 MG/DL (ref 8.4–10.2)
CHLORIDE SERPL-SCNC: 104 MMOL/L (ref 96–112)
CO2 SERPL-SCNC: 28 MMOL/L (ref 20–33)
CREAT SERPL-MCNC: 1.04 MG/DL (ref 0.5–1.4)
EOSINOPHIL # BLD AUTO: 0.13 K/UL (ref 0–0.51)
EOSINOPHIL NFR BLD: 1.9 % (ref 0–6.9)
ERYTHROCYTE [DISTWIDTH] IN BLOOD BY AUTOMATED COUNT: 45.1 FL (ref 35.9–50)
GFR SERPLBLD CREATININE-BSD FMLA CKD-EPI: 57 ML/MIN/1.73 M 2
GLOBULIN SER CALC-MCNC: 2.4 G/DL (ref 1.9–3.5)
GLUCOSE SERPL-MCNC: 111 MG/DL (ref 65–99)
HCT VFR BLD AUTO: 40.3 % (ref 37–47)
HGB BLD-MCNC: 12.9 G/DL (ref 12–16)
IMM GRANULOCYTES # BLD AUTO: 0.04 K/UL (ref 0–0.11)
IMM GRANULOCYTES NFR BLD AUTO: 0.6 % (ref 0–0.9)
LYMPHOCYTES # BLD AUTO: 1.25 K/UL (ref 1–4.8)
LYMPHOCYTES NFR BLD: 18 % (ref 22–41)
MCH RBC QN AUTO: 28.9 PG (ref 27–33)
MCHC RBC AUTO-ENTMCNC: 32 G/DL (ref 32.2–35.5)
MCV RBC AUTO: 90.4 FL (ref 81.4–97.8)
MONOCYTES # BLD AUTO: 0.47 K/UL (ref 0–0.85)
MONOCYTES NFR BLD AUTO: 6.8 % (ref 0–13.4)
NEUTROPHILS # BLD AUTO: 5.04 K/UL (ref 1.82–7.42)
NEUTROPHILS NFR BLD: 72.3 % (ref 44–72)
NRBC # BLD AUTO: 0 K/UL
NRBC BLD-RTO: 0 /100 WBC (ref 0–0.2)
PLATELET # BLD AUTO: 214 K/UL (ref 164–446)
PMV BLD AUTO: 10.8 FL (ref 9–12.9)
POTASSIUM SERPL-SCNC: 4.5 MMOL/L (ref 3.6–5.5)
PROT SERPL-MCNC: 6.3 G/DL (ref 6–8.2)
RBC # BLD AUTO: 4.46 M/UL (ref 4.2–5.4)
SODIUM SERPL-SCNC: 140 MMOL/L (ref 135–145)
WBC # BLD AUTO: 7 K/UL (ref 4.8–10.8)

## 2023-09-27 PROCEDURE — 700111 HCHG RX REV CODE 636 W/ 250 OVERRIDE (IP): Performed by: STUDENT IN AN ORGANIZED HEALTH CARE EDUCATION/TRAINING PROGRAM

## 2023-09-27 PROCEDURE — 700105 HCHG RX REV CODE 258: Performed by: STUDENT IN AN ORGANIZED HEALTH CARE EDUCATION/TRAINING PROGRAM

## 2023-09-27 PROCEDURE — A9270 NON-COVERED ITEM OR SERVICE: HCPCS | Performed by: STUDENT IN AN ORGANIZED HEALTH CARE EDUCATION/TRAINING PROGRAM

## 2023-09-27 PROCEDURE — 36415 COLL VENOUS BLD VENIPUNCTURE: CPT

## 2023-09-27 PROCEDURE — 700102 HCHG RX REV CODE 250 W/ 637 OVERRIDE(OP): Performed by: STUDENT IN AN ORGANIZED HEALTH CARE EDUCATION/TRAINING PROGRAM

## 2023-09-27 PROCEDURE — 80053 COMPREHEN METABOLIC PANEL: CPT

## 2023-09-27 PROCEDURE — 85025 COMPLETE CBC W/AUTO DIFF WBC: CPT

## 2023-09-27 PROCEDURE — 99285 EMERGENCY DEPT VISIT HI MDM: CPT

## 2023-09-27 PROCEDURE — 70496 CT ANGIOGRAPHY HEAD: CPT

## 2023-09-27 PROCEDURE — 96375 TX/PRO/DX INJ NEW DRUG ADDON: CPT | Mod: XU

## 2023-09-27 PROCEDURE — 700117 HCHG RX CONTRAST REV CODE 255: Performed by: STUDENT IN AN ORGANIZED HEALTH CARE EDUCATION/TRAINING PROGRAM

## 2023-09-27 PROCEDURE — 96374 THER/PROPH/DIAG INJ IV PUSH: CPT | Mod: XU

## 2023-09-27 RX ORDER — PROCHLORPERAZINE EDISYLATE 5 MG/ML
10 INJECTION INTRAMUSCULAR; INTRAVENOUS ONCE
Status: COMPLETED | OUTPATIENT
Start: 2023-09-27 | End: 2023-09-27

## 2023-09-27 RX ORDER — DEXAMETHASONE SODIUM PHOSPHATE 4 MG/ML
10 INJECTION, SOLUTION INTRA-ARTICULAR; INTRALESIONAL; INTRAMUSCULAR; INTRAVENOUS; SOFT TISSUE ONCE
Status: COMPLETED | OUTPATIENT
Start: 2023-09-27 | End: 2023-09-27

## 2023-09-27 RX ORDER — MECLIZINE HYDROCHLORIDE 25 MG/1
25 TABLET ORAL ONCE
Status: COMPLETED | OUTPATIENT
Start: 2023-09-27 | End: 2023-09-27

## 2023-09-27 RX ORDER — LORAZEPAM 2 MG/ML
1 INJECTION INTRAMUSCULAR ONCE
Status: COMPLETED | OUTPATIENT
Start: 2023-09-27 | End: 2023-09-27

## 2023-09-27 RX ORDER — DIPHENHYDRAMINE HYDROCHLORIDE 50 MG/ML
12.5 INJECTION INTRAMUSCULAR; INTRAVENOUS ONCE
Status: COMPLETED | OUTPATIENT
Start: 2023-09-27 | End: 2023-09-27

## 2023-09-27 RX ORDER — IBUPROFEN 200 MG
400 TABLET ORAL ONCE
Status: COMPLETED | OUTPATIENT
Start: 2023-09-27 | End: 2023-09-27

## 2023-09-27 RX ORDER — SODIUM CHLORIDE 9 MG/ML
INJECTION, SOLUTION INTRAVENOUS ONCE
Status: COMPLETED | OUTPATIENT
Start: 2023-09-27 | End: 2023-09-27

## 2023-09-27 RX ADMIN — LORAZEPAM 1 MG: 2 INJECTION INTRAMUSCULAR; INTRAVENOUS at 04:00

## 2023-09-27 RX ADMIN — DEXAMETHASONE SODIUM PHOSPHATE 10 MG: 4 INJECTION INTRA-ARTICULAR; INTRALESIONAL; INTRAMUSCULAR; INTRAVENOUS; SOFT TISSUE at 03:25

## 2023-09-27 RX ADMIN — DIPHENHYDRAMINE HYDROCHLORIDE 12.5 MG: 50 INJECTION, SOLUTION INTRAMUSCULAR; INTRAVENOUS at 03:25

## 2023-09-27 RX ADMIN — MECLIZINE HYDROCHLORIDE 25 MG: 25 TABLET ORAL at 02:15

## 2023-09-27 RX ADMIN — SODIUM CHLORIDE: 9 INJECTION, SOLUTION INTRAVENOUS at 03:24

## 2023-09-27 RX ADMIN — IBUPROFEN 400 MG: 200 TABLET, FILM COATED ORAL at 02:15

## 2023-09-27 RX ADMIN — PROCHLORPERAZINE EDISYLATE 10 MG: 5 INJECTION INTRAMUSCULAR; INTRAVENOUS at 03:25

## 2023-09-27 RX ADMIN — IOHEXOL 100 ML: 350 INJECTION, SOLUTION INTRAVENOUS at 04:34

## 2023-09-27 ASSESSMENT — PAIN DESCRIPTION - DESCRIPTORS: DESCRIPTORS: ACHING

## 2023-09-27 NOTE — ED NOTES
Patient ambulated to restroom and back to [facility] accompanied by RN.

## 2023-09-27 NOTE — ED TRIAGE NOTES
Chief Complaint   Patient presents with    Earache     Pt present to ed c/o left earache coupled with vertigo, pt states she was seen at [facility] earlier today for fluid in her left ear      /71   Pulse 74   Temp 36.8 °C (98.2 °F) (Temporal)   Resp 16   Wt 63.5 kg (140 lb)   SpO2 98%   BMI 25.61 kg/m²

## 2023-09-27 NOTE — ED PROVIDER NOTES
ED Provider Note    CHIEF COMPLAINT  Chief Complaint   Patient presents with    Earache     Pt present to ed c/o left earache coupled with vertigo, pt states she was seen at [facility] urgent care earlier today for fluid in her left ear        EXTERNAL RECORDS REVIEWED  Outpatient Notes urgent care visit yesterday for earache and vertigo    HPI/ROS  LIMITATION TO HISTORY   Select: : None  OUTSIDE HISTORIAN(S):      Aliza Jacobson is a 73 y.o. female who presents with left earache, vertigo.  Patient was at urgent care yesterday and they told her that she had fluid in her ear.  Patient was taking antihistamines without relief.  Patient denies vision changes.  Patient endorses sensation of ear fullness and pain.  Patient denies weakness or numbness, slurred speech, facial droop.  Patient nauseous with vomiting.    PAST MEDICAL HISTORY   has a past medical history of Backpain, Breast CA (HCC), Bronchitis, Cancer (HCC), Migraine, Other specified symptom associated with female genital organs, Pneumonia, Psychiatric problem, Renal disorder, and Secondary hypertension (2021).    SURGICAL HISTORY   has a past surgical history that includes lumpectomy () and other.    FAMILY HISTORY  Family History   Problem Relation Age of Onset    Cancer Mother     Cancer Father     Heart Disease Father        SOCIAL HISTORY  Social History     Tobacco Use    Smoking status: Former     Current packs/day: 0.00     Average packs/day: 1 pack/day for 1 year (1.0 ttl pk-yrs)     Types: Cigarettes     Start date: 1973     Quit date: 1974     Years since quittin.2    Smokeless tobacco: Never   Vaping Use    Vaping Use: Never used   Substance and Sexual Activity    Alcohol use: Yes     Comment: rarely    Drug use: No    Sexual activity: Never     Partners: Male       CURRENT MEDICATIONS  Home Medications    **Home medications have not yet been reviewed for this encounter**         ALLERGIES  No Known Allergies    PHYSICAL  EXAM  VITAL SIGNS: BP (!) 141/85   Pulse 78   Temp 36.8 °C (98.2 °F) (Temporal)   Resp 18   Wt 63.5 kg (140 lb)   SpO2 93%   BMI 25.61 kg/m²    Vitals and nursing note reviewed.   Constitutional:       Comments: Patient is lying in bed supine, pleasant, conversant, speaking in complete sentences, patient is visibly uncomfortable.  HENT:      Head: Normocephalic and atraumatic.   TMs clear bilaterally, no air-fluid levels, bulging, exudate, external auditory canals within normal limits without abnormality  Eyes:      Extraocular Movements: Extraocular movements intact.      Conjunctiva/sclera: Conjunctivae normal.      Pupils: Pupils are equal, round, and reactive to light.   Cardiovascular:      Pulses: Normal pulses.      Comments: HR 74  Pulmonary:      Effort: Pulmonary effort is normal. No respiratory distress.   Musculoskeletal:         General: No swelling. Normal range of motion.      Cervical back: Normal range of motion. No rigidity.   Skin:     General: Skin is warm and dry.      Capillary Refill: Capillary refill takes less than 2 seconds.   Neurological:      Mental Status: Alert.  Sensation light touch was intact in bilateral upper and lower extremities.  No ataxia.  Patient has bilateral nystagmus.  Patient has vertigo symptoms which are precipitated by head movement.      DIAGNOSTIC STUDIES / PROCEDURES    LABS  No leukocytosis    RADIOLOGY  I have independently interpreted the diagnostic imaging associated with this visit and am waiting the final reading from the radiologist.   My preliminary interpretation is as follows: No intracranial hemorrhage  Radiologist interpretation: No large vessel occlusion    COURSE & MEDICAL DECISION MAKING    INITIAL ASSESSMENT, COURSE AND PLAN  Care Narrative: No neurologic deficits, vision changes.  Anterior vs posterior circulation stroke is inconsistent with patient presentation at this time.  Patient differential diagnosis includes BPPV, labyrinthitis,  vestibular neuritis, Ménière's disease, otitis media.  BPPV is the obvious possibility however given patient's fullness, nausea, Ménière's disease is also possible.  Patient given Tylenol for pain and meclizine for vertigo symptoms.  Disposition pending work-up and symptom control.    Electronically signed by: Bart Garcia M.D., 9/27/2023 2:18 AM    CMP demonstrates no evidence of acute kidney injury, acute electrolyte abnormality, acute liver failure, CBC demonstrates no evidence of acute anemia or leukocytosis.  CT imaging inconsistent with intracranial hemorrhage or large vessel occlusion.  Patient reports that vertigo improved following meclizine however she did have some constant headache which sounded like a tension headache.  Patient then received IV fluids, Compazine, Benadryl which did help her headache significantly however patient then was anxious likely from the Compazine.  Patient then received Ativan and was feeling much better afterwards.  Patient ambulate without assistance, tolerating p.o. intake.  Unclear etiology of vertigo most likely BPPV versus Ménière's disease.  Patient counseled to follow-up with ENT and referral has been placed.    Repeat physical exam benign.  I doubt any serious emergency process at this time.  Patient and/or family, friends given strict return precautions for worsening symptoms and care instructions. They have demonstrated understanding of discharge instructions through teach back mechanism. Advised PCP follow-up in 1-2 days.  Patient/family/friend expresses understanding and agrees to plan.    This dictation has been created using voice recognition software. I am continuously working with the software to minimize the number of voice recognition errors and I have made every attempt to manually correct the errors within my dictation. However errors  related to this voice recognition software may still exist and should be interpreted within the appropriate  context.     Electronically signed by: Bart Garcia M.D., 9/27/2023 6:05 AM      HYDRATION: Based on the patient's presentation of Dehydration the patient was given IV fluids. IV Hydration was used because oral hydration was not adequate alone. Upon recheck following hydration, the patient was improved.        DISPOSITION AND DISCUSSIONS  Escalation of care considered, and ultimately not performed:acute inpatient care management, however at this time, the patient is most appropriate for outpatient management      Decision tools and prescription drugs considered including, but not limited to: Antibiotics not indicated in the absence of bacterial infection .    FINAL DIAGNOSIS  1. Vertigo    2. Nonintractable headache, unspecified chronicity pattern, unspecified headache type           Electronically signed by: Bart Garcia M.D., 9/27/2023 2:14 AM

## 2023-09-27 NOTE — ED NOTES
IV removed. Patient provided discharge instructions and prescription education. Patient denies questions at this time. Patient ambulated out of ED independently with steady gait accompanied by son and RN. No acute changes or concerns at this time.

## 2023-10-03 DIAGNOSIS — H69.92 EUSTACHIAN TUBE DYSFUNCTION, LEFT: ICD-10-CM

## 2023-10-03 DIAGNOSIS — R42 VERTIGO: ICD-10-CM
